# Patient Record
Sex: FEMALE | Race: WHITE | NOT HISPANIC OR LATINO | Employment: OTHER | URBAN - METROPOLITAN AREA
[De-identification: names, ages, dates, MRNs, and addresses within clinical notes are randomized per-mention and may not be internally consistent; named-entity substitution may affect disease eponyms.]

---

## 2018-07-25 ENCOUNTER — OFFICE VISIT CONVERTED (OUTPATIENT)
Dept: PULMONOLOGY | Facility: CLINIC | Age: 68
End: 2018-07-25
Attending: INTERNAL MEDICINE

## 2018-09-27 ENCOUNTER — OFFICE VISIT CONVERTED (OUTPATIENT)
Dept: PULMONOLOGY | Facility: CLINIC | Age: 68
End: 2018-09-27
Attending: INTERNAL MEDICINE

## 2019-02-07 ENCOUNTER — OFFICE VISIT CONVERTED (OUTPATIENT)
Dept: PULMONOLOGY | Facility: CLINIC | Age: 69
End: 2019-02-07
Attending: INTERNAL MEDICINE

## 2019-07-19 ENCOUNTER — OFFICE VISIT CONVERTED (OUTPATIENT)
Dept: PULMONOLOGY | Facility: CLINIC | Age: 69
End: 2019-07-19
Attending: PHYSICIAN ASSISTANT

## 2020-01-10 ENCOUNTER — OFFICE VISIT CONVERTED (OUTPATIENT)
Dept: PULMONOLOGY | Facility: CLINIC | Age: 70
End: 2020-01-10
Attending: INTERNAL MEDICINE

## 2021-05-28 VITALS
OXYGEN SATURATION: 98 % | RESPIRATION RATE: 16 BRPM | OXYGEN SATURATION: 98 % | DIASTOLIC BLOOD PRESSURE: 64 MMHG | HEIGHT: 61 IN | OXYGEN SATURATION: 97 % | HEART RATE: 72 BPM | TEMPERATURE: 98 F | HEART RATE: 84 BPM | HEART RATE: 76 BPM | HEIGHT: 61 IN | SYSTOLIC BLOOD PRESSURE: 128 MMHG | RESPIRATION RATE: 18 BRPM | BODY MASS INDEX: 42.29 KG/M2 | HEIGHT: 61 IN | BODY MASS INDEX: 39.7 KG/M2 | HEART RATE: 67 BPM | WEIGHT: 224 LBS | DIASTOLIC BLOOD PRESSURE: 88 MMHG | BODY MASS INDEX: 39.55 KG/M2 | TEMPERATURE: 98.1 F | TEMPERATURE: 98.2 F | WEIGHT: 209.5 LBS | WEIGHT: 215.06 LBS | WEIGHT: 210.25 LBS | SYSTOLIC BLOOD PRESSURE: 136 MMHG | DIASTOLIC BLOOD PRESSURE: 69 MMHG | RESPIRATION RATE: 14 BRPM | SYSTOLIC BLOOD PRESSURE: 121 MMHG | RESPIRATION RATE: 14 BRPM | HEIGHT: 61 IN | BODY MASS INDEX: 40.6 KG/M2 | SYSTOLIC BLOOD PRESSURE: 113 MMHG | DIASTOLIC BLOOD PRESSURE: 70 MMHG | OXYGEN SATURATION: 95 %

## 2021-05-28 VITALS
WEIGHT: 207.37 LBS | RESPIRATION RATE: 14 BRPM | HEIGHT: 61 IN | SYSTOLIC BLOOD PRESSURE: 134 MMHG | OXYGEN SATURATION: 99 % | HEART RATE: 65 BPM | BODY MASS INDEX: 39.15 KG/M2 | TEMPERATURE: 97.9 F | DIASTOLIC BLOOD PRESSURE: 85 MMHG

## 2021-05-28 NOTE — PROGRESS NOTES
Patient: EDMUND MOJICA     Acct: [id number]     Report: #[id number]  UNIT #: [id number]     : 1950    Encounter Date:2019  PRIMARY CARE: MATTHEW PETERSON  ***Signed***  --------------------------------------------------------------------------------------------------------------------  Chief Complaint      Encounter Date      2019            Primary Care Provider      REINA PETERSON            Referring Provider      REINA PETERSON            Patient Complaint      Patient is complaining of      Pt here for 6m f/u, copd            VITALS      Height 5 ft 1 in / 154.94 cm      Weight 207 lbs 6 oz / 94.521728 kg      BSA 1.92 m2      BMI 39.2 kg/m2      Temperature 97.9 F / 36.61 C - Oral      Pulse 65      Respirations 14      Blood Pressure 134/85 Sitting, Right Arm      Pulse Oximetry 99%, Room air            HPI      The patient is a pleasant 68 year old white female patient of Dr. Malcolm's here    for routine six month follow up today. She has had a history of COPD, mild     intermittent asthma, hypogammaglobulinemia.  She has been well-controlled on     Brovana and Pulmicort nebs along with Lonhala and tells me she has done well     since her last visit.  She has not been hospitalized or needed antibiotics or     steroids for any breathing issues since her last visit, however she tells she     has had steroids for sciatic pain which is improving. She feels like her     breathing actually does a little bit better in the summer when it is warmer and     does worse with cold air in winter.  She denies any increased dyspnea, coughing,    wheezing, hemoptysis, fever or chills.              I have reviewed her ROS, medical, surgical and family history and agree with     those as entered in the chart.      Copies To:   Jacky Malcolm ;            BYRON      Constitutional:  Denies: Fatigue, Fever, Weight gain, Weight loss, Chills,     Insomnia, Other      Respiratory/Breathing:   Complains of: Cough; Denies: Shortness of air, Wheezing,    Hemoptysis, Pleuritic pain, Other      Endocrine:  Denies: Polydipsia, Polyuria, Heat/cold intolerance, Abnorml     menstrual pattern, Diabetes, Other      Eyes:  Denies: Blurred vision, Vision Changes, Other      Ears, nose, mouth, throat:  Denies: Congestion, Dysphagia, Hearing Changes, Nose    Bleeding, Nasal Discharge, Throat pain, Tinnitus, Other      Cardiovascular:  Denies: Chest Pain, Exertional dyspnea, Peripheral Edema,     Palpitations, Syncope, Wake up Gasping for air, Orthopnea, Tachycardia, Other      Gastrointestinal:  Denies: Abdominal pain/cramping, Bloody stools, Constipation,    Diarrhea, Melena, Nausea, Vomiting, Other      Genitourinary:  Denies: Dysuria, Urinary frequency, Incontinence, Hematuria,     Urgency, Other      Musculoskeletal:  Denies: Joint Pain, Joint Stiffness, Joint Swelling, Myalgias,    Other      Hematologic/lymphatic:  DENIES: Lymphadenopathy, Bruising, Bleeding tendencies,     Other      Neurologic:  Denies: Headache, Numbness, Weakness, Seizures, Other      Psychiatric:  Denies: Anxiety, Appropriate Effect, Depression, Other      Sleep:  No: Excessive daytime sleep, Morning Headache?, Snoring, Insomnia?, Stop    breathing at sleep?, Other      Integumentary:  Denies: Rash, Dry skin, Skin Warm to Touch, Other            FAMILY/SOCIAL/MEDICAL HX      Surgical History:  Yes: Appendectomy, Cholecystectomy, Orthopedic Surgery (Right    knee); No: AAA Repair, Abdominal Surgery, Adenoids, Angioplasty, Back Surgery,     Bladder Surgery, Bowel Surgery, Breast Surgery, CABG, Carotid Stenosis, Ear     Surgery, Eye Surgery, Head Surgery, Hernia Surgery, Kidney Surgery, Nose     Surgery, Oral Surgery, Prostatectomy, Rectal Surgery, Spinal Surgery, Testicular    Surgery, Throat Surgery, Tonsils, Valve Replacement, Vascular Surgery, Other     Surgeries      Heart - Family Hx:  Father      Cancer/Type - Family Hx:  Mother, Father,  Brother, Sister      Other Family Medical History:  Mother, Father      Is Father Still Living?:  No      Is Mother Still Living?:  No       Family History:  Yes      Social History:  No Tobacco Use, No Alcohol Use, No Recreational Drug use      Smoking status:  Former smoker (smoked a little, quit 47 years ago)      Anticoagulation Therapy:  Yes      Antibiotic Prophylaxis:  No      Medical History:  Yes: Allergies, Arthritis, Asthma, Chronic Bronchitis/COPD,     Diabetes, Heart Attack, High Blood Pressure, High Cholesterol, Reflux Disease,     Thyroid Problem, Miscellaneous Medical/oth (afib); No: Alcoholism, Anemia,     Atrial Fibrillation, Blood Disease, Broken Bones, Cataracts, Chemical     Dependency, Chemotherapy/Cancer, Emphysema, Chronic Liver Disease, Colon     Trouble, Colitis, Diverticulitis, Congestive Heart Failu, Deafness or Ringing     Ears, Convulsions, Depression, Anxiety, Bipolar Disorder, PTSD, Epilepsy,     Seizures, Forgetfullness, Glaucoma, Gall Stones, Gout, Head Injury, Heart     Murmur, GERD, Hemorrhoids/Rectal Prob, Hepatitis, Hiatal Hernia, HIV (Do not ask    - volu, Jaundice, Kidney or Bladder Disease, Kidney Stones, Migrane Headaches,     Mitral Valve Prolapse, Night sweats, Phlebitis, Psychiatric Care, Rheumatic     Fever, Sexually Transmitted Dis, Shortness Of Breath, Sinus Trouble, Skin     Disease/Psoriais/Ecz, Stroke, Tuberculosis or Pos TB Te      Psychiatric History      None            PREVENTION      Hx Influenza Vaccination:  Yes      Date Influenza Vaccine Given:  Oct 1, 2018      Influenza Vaccine Declined:  No      2 or More Falls Past Year?:  No      Fall Past Year with Injury?:  No      Hx Pneumococcal Vaccination:  Yes      Encouraged to follow-up with:  PCP regarding preventative exams.      Chart initiated by      Renetta Haro MA            ALLERGIES/MEDICATIONS      Allergies:        Coded Allergies:             CEPHALEXIN (Verified  Allergy, Intermediate, vomiting,  7/19/19)           CHLORPHENIRAMINE (Verified  Allergy, Intermediate, 7/19/19)           CODEINE (Verified  Allergy, Intermediate, swelling, 7/19/19)           HYDROCODONE (Verified  Allergy, Intermediate, 7/19/19)           NALBUPHINE (Verified  Allergy, Intermediate, breaks out, 7/19/19)           PENICILLINS (Verified  Allergy, Intermediate, swellling, 7/19/19)      Medications    Last Reconciled on 7/19/19 08:48 by JONY FERNANDEZ      Neb-Budesonide (Pulmicort) 0.5 Mg/2 Ml Ampul.neb      0.5 MG INH RTBID, #60 NEB 3 Refills         Prov: Jacky Malcolm         5/24/19       Arformoterol Tartrate (Brovana) 15 Mcg/2 Ml Vial.neb      15 MCG INH RTBID, #60 NEB 11 Refills         Prov: Jacky Malcolm         9/27/18       Montelukast Sodium (Singulair*) 10 Mg Tab      10 MG PO HS, #30 TAB 11 Refills         Prov: Jacky Malcolm         9/27/18       Aspirin Chew (Aspirin Chew) 81 Mg Tab.chew      81 MG PO QDAY, #30 TAB.CHEW 0 Refills         Reported         9/27/18       Glycopyrrol/Nebulizer/Accessor (Lonhala Magnair 25 Mcg Starter) 25 Mcg/1 Ml     Vial.neb      MCG INH RTBID, #60         Reported         7/25/18       Cyanocobalamin (Vitamin B-12*) 1,000 Mcg Tablet.er      1000 MCG PO QDAY, #30 TAB.ER         Reported         7/25/18       Apixaban (Eliquis) 5 Mg Tablet      5 MG PO BID for 30 Days, #60 TAB         Reported         7/25/18       Albuterol/Ipratropium (Duoneb) 3 Ml Ampul.neb      3 ML INH Q4H PRN for SHORTNESS OF BREATH, #120 NEB 0 Refills         Reported         7/25/18       Liraglutide (Victoza 2-SAM) 0.1 Ml Cartridge      18 MG SQ QDAY, EA         Reported         7/25/18       Ezetimide (Zetia) 10 Mg Tablet      10 MG PO HS, #30 TAB 0 Refills         Reported         7/25/18       Ranitidine HCl (Zantac*) 150 Mg Tablet      150 MG PO HS for 30 Days, #30 TAB         Reported         7/25/18       Nitroglycerin (Nitrostat*) 0.4 Mg Tablet      0.4 MG SL ASDIR, #25 TAB 1 Refill          Reported         7/25/18       Metoprolol Succinate (Metoprolol Succinate) 50 Mg Tab.er.24h      25 MG PO BID, #30 TAB.SR.24H 0 Refills         Reported         7/25/18       Loratadine (Loratadine) 10 Mg Tablet      10 MG PO QDAY, #30 TAB 0 Refills         Reported         7/25/18       Lisinopril/HCTZ 20/25 MG (Lisinopril/HCTZ 20/25 MG*) 1 Each Tablet      0.5 TAB PO QDAY, #30 TAB 0 Refills         Reported         7/25/18       Levothyroxine Sodium (Levoxyl*) 0.05 Mg Tablet      0.05 MG PO QDAY@07, #30 TAB 0 Refills         Reported         7/25/18       Gabapentin (Gabapentin) 400 Mg Capsule      400 MG PO TID, #90 CAP 0 Refills         Reported         7/25/18       Hunter-Fluticasone (Fluticasone 50 mcg) 16 Gm Spray.susp      2 PUFFS NARE EACH QDAY, #1 BOTTLE 0 Refills         Reported         7/25/18       Atorvastatin (Atorvastatin) 20 Mg Tablet      20 MG PO HS, #30 TAB 0 Refills         Reported         7/25/18      Current Medications      Current Medications Reviewed 7/19/19            EXAM      GEN-patient appears stated age resting comfortable in no acute distress      Eyes-PERRL,  conjunctiva are normal in appearance extraocular muscles are i    ntact, no scleral icterus      Nasal-both nares are patent turbinates appear normal no polyps seen no nasal     discharge or ulcerations      Ears-tympanic membranes are normal no erythema no bulging, normal to inspection      Lymphatic-no swollen or enlarged cervical nodes, or axillary node, or femoral     nodes, or supraclavicular nodes      Mouth normal dentition, no erythema no ulcerations oropharynx appears normal no     exudate no evidence of postnasal drip, MP(default value)      Neck-there are no palpable supraclavicular or cervical adenopathy, thyroid is     normal in appearance no apparent nodules, there is no inspiratory or expiratory     stridor      Respiratory-Lungs are grossly clear to auscultation.  No wheezes, rhonchi or     crackles appreciated.   Normal work of breathing.        Cardiovascular-the heart rate is normal and regular S1 and S2 present with no     murmur or extra heart sounds, there is no JVD or pedal edema present      GI-the abdomen is normal in appearance, bowel sounds present and normal in all     quadrants no hepatosplenomegaly or masses felt      Extremities-no clubbing is present, pulses present in all extremities, capillary    refill time is normal      Musculoskeletal-Normal strength in upper and lower extremities, inspection shows    no evidence of muscle atrophy      Skin-skin is normal in appearance it is warm and dry, no rashes present, no     evidence of cyanosis, palpation reveals no masses      Neurological-the patient is alert and oriented to time place and person, moves     all 4 extremities, normal gait, normal affect and mood, CN2-12 intact      Psych-normal judgment and insight is good, normal mood and affect, alert and     oriented to person, place, time and date      Vitals      Vitals:             Height 5 ft 1 in / 154.94 cm           Weight 207 lbs 6 oz / 94.341516 kg           BSA 1.92 m2           BMI 39.2 kg/m2           Temperature 97.9 F / 36.61 C - Oral           Pulse 65           Respirations 14           Blood Pressure 134/85 Sitting, Right Arm           Pulse Oximetry 99%, Room air            REVIEW      Results Reviewed      [facility] Results Reviewed?:  Yes Prev Lab Results, Yes Prev Radiology Results, Yes     Previous [facility] Records      Lab Results      I have personally reviewed her previous lab work, imaging and provider notes.            Assessment      Notes      Renewed Medications      * Montelukast Sodium (Singulair*) 10 MG TAB: 10 MG PO HS #30      * ARFORMOTEROL TARTRATE (Brovana) 15 MCG/2 ML VIAL.NEB: 15 MCG INH RTBID #60         Instructions: DIAGNOSIS CODE REQUIRED PRIOR TO PRESCRIBING.      ASSESSMENT:       1. Mild intermittent asthma without acute exacerbation.      2. COPD without acute  exacerbation.        3. Hypogammaglobulinemia.            PLAN:      1. Continue current with regimen with Brovana and Pulmicort bid, Lonhala nebs     twice a day and Singulair.      2.  Up-to-date on Fluzone vaccine and Pneumovax.      3.  I will have the patient follow up in six months with Dr. Malcolm, sooner if     needed.            Patient Education      Patient Education Provided:  COPD, How to use a Nebulizer      Time Spent:  > 50% /[facility]                 Disclaimer: Converted document may not contain table formatting or lab diagrams. Please see [facility] for the authenticated document.

## 2021-05-28 NOTE — PROGRESS NOTES
Patient: EDMUND MOJICA     Acct: [id number]     Report: #[id number]  UNIT #: [id number]     : 1950    Encounter Date:2018  PRIMARY CARE: MATTHEW CASAREZ  ***Signed***  --------------------------------------------------------------------------------------------------------------------  Chief Complaint      Encounter Date      2018            Primary Care Provider            Cristian Casarez            Referring Provider            Cristian Casarez            Patient Complaint      Patient is complaining of      New pt here for copd            VITALS      Height 5 ft 1 in / 154.94 cm      Weight 215 lbs 1 oz / 97.016082 kg      BSA 2.10 m2      BMI 40.6 kg/m2      Pulse 76      Respirations 14      Blood Pressure 113/70 Sitting, Left Arm      Pulse Oximetry 95%, Room air            HPI      The patient is a 67 year old white female with reported COPD here today for     evaluation. The patient has been hospitalized every several months for the past     three years for reported COPD, though she has never smoked.  The patient has     been told that she has asthma in the past.  She does have shortness of breath.      On a good week she will have shortness of breath approximately three days per     week.  Most weeks she is short of breath nearly every day.  Her shortness of     breath is worse when she does certain activities such as bending over and     putting on her shoes.  She is able to change her clothes without having any     difficulties, however when she showers, she becomes very dyspneic.  There are     also certain fragrances that makes her more short of breath as well such as     vanilla fragrances and certain strong perform as well as strong scented     candles.  The patient does take Brovana and Spiriva, but only takes Brovana on     an as needed basis. She has had some issues taking medications in the past.  At     one time she was taking Brovana every 4 hours as needed as  opposed to her     DuoNebs.  She denies having any chest pains, lower extremity edema or heart     failure type symptoms. 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 The patient     does have cough as well.  Her cough is dry and nonproductive, no hemoptysis.      It is partially relieved with her bronchodilator therapies, but not fully.  The     patient does have particular times of the year where her symptoms are worse,     usually in the spring and the fall. She does endorse some allergies, but is not     currently taking any medications for this at this time other than some prn     loratadine that she does not take regularly.            ROS      Constitutional:  Denies: Fatigue, Fever, Weight gain, Weight loss, Chills,     Insomnia, Other      Respiratory/Breathing:  Denies: Shortness of air, Wheezing, Cough, Hemoptysis,     Pleuritic pain, Other      Endocrine:  Denies: Polydipsia, Polyuria, Heat/cold intolerance, Abnorml     menstrual pattern, Diabetes, Other      Eyes:  Denies: Blurred vision, Vision Changes, Other      Ears, nose, mouth, throat:  Denies: Congestion, Dysphagia, Hearing Changes,     Nose Bleeding, Nasal Discharge, Throat pain, Tinnitus, Other      Cardiovascular:  Denies: Chest Pain, Exertional dyspnea, Peripheral Edema,     Palpitations, Syncope, Wake up Gasping for air, Orthopnea, Tachycardia, Other      Gastrointestinal:  Denies: Abdominal pain/cramping, Bloody stools, Constipation    , Diarrhea, Melena, Nausea, Vomiting, Other      Genitourinary:  Denies: Dysuria, Urinary frequency, Incontinence, Hematuria,     Urgency, Other      Musculoskeletal:  Denies: Joint Pain, Joint Stiffness, Joint Swelling, Myalgias    , Other      Hematologic/lymphatic:  DENIES: Lymphadenopathy, Bruising, Bleeding tendencies,     Other      Neurologic:  Denies:  Headache, Numbness, Weakness, Seizures, Other      Psychiatric:  Denies: Anxiety, Appropriate Effect, Depression, Other      Sleep:  Yes: Snoring, Insomnia?, No: Excessive daytime sleep, Morning Headache?    , Stop breathing at sleep?, Other      Integumentary:  Denies: Rash, Dry skin, Skin Warm to Touch, Other            FAMILY/SOCIAL/MEDICAL HX      Surgical History:  Yes: Appendectomy, Cholecystectomy, Orthopedic Surgery (    Right knee)      Heart - Family Hx:  Father      Cancer/Type - Family Hx:  Mother, Father, Brother, Sister      Other Family Medical History:  Mother, Father      Is Father Still Living?:  No      Is Mother Still Living?:  No      Smoking status:  Former smoker (smoked a little, quit 47 years ago)      Anticoagulation Therapy:  Yes      Antibiotic Prophylaxis:  No      Medical History:  Yes: Allergies, Arthritis, Asthma, Chronic Bronchitis/COPD,     Diabetes, Heart Attack, High Blood Pressure, High Cholesterol, Reflux Disease,     Thyroid Problem, Miscellaneous Medical/oth (afib)      Psychiatric History      none            PREVENTION      Hx Influenza Vaccination:  Yes      Date Influenza Vaccine Given:  Oct 1, 2017      Influenza Vaccine Declined:  No      2 or More Falls Past Year?:  No      Fall Past Year with Injury?:  No      Hx Pneumococcal Vaccination:  Yes      Encouraged to follow-up with:  PCP regarding preventative exams.      Chart initiated by      nigel macias/ ma            ALLERGIES/MEDICATIONS      Allergies:        Coded Allergies:             Codeine (Verified  Allergy, Intermediate, swelling, 7/25/18)           Keflex (Verified  Allergy, Intermediate, vomiting, 7/25/18)           Nubain (Verified  Allergy, Intermediate, breaks out, 7/25/18)           Penicillins (Verified  Allergy, Intermediate, swellling, 7/25/18)           Tussionex (Verified  Allergy, Intermediate, 7/25/18)      Medications      Glycopyrrol/Nebulizer/Accessor (Lonhala Magnair 25 Mcg Starter) 25 Mcg/1  Ml     Vial.neb      MCG INH RTBID, #60         Reported         7/25/18       Arformoterol Tartrate (Brovana) 15 Mcg/2 Ml Vial.neb      15 MCG INH RTBID, #60 NEB         Reported         7/25/18       Cyanocobalamin (Vitamin B-12*) 1,000 Mcg Tablet.er      1000 MCG PO QDAY, #30 TAB.ER         Reported         7/25/18       Apixaban (Eliquis) 5 Mg Tablet      5 MG PO BID for 30 Days, #60 TAB         Reported         7/25/18       Albuterol/Ipratropium (Duoneb) 3 Ml Ampul.neb      3 ML INH Q4H Y for SHORTNESS OF BREATH, #120 NEB 0 Refills         Reported         7/25/18       Liraglutide (Victoza Inj) 0.1 Ml Cartridge      18 MG SQ QDAY, EA         Reported         7/25/18       Ezetimide (Zetia) 10 Mg Tablet      10 MG PO HS, #30 TAB 0 Refills         Reported         7/25/18       Ranitidine HCl (Zantac*) 150 Mg Tablet      150 MG PO HS for 30 Days, #30 TAB         Reported         7/25/18       Clopidogrel Bisulfate (Plavix) 75 Mg Tablet      75 MG PO QDAY, #30 TAB 0 Refills         Reported         7/25/18       Nitroglycerin (Nitrostat*) 0.4 Mg Tablet      0.4 MG SL ASDIR, #25 TAB 1 Refill         Reported         7/25/18       Metoprolol Succinate (Metoprolol Succinate*) 50 Mg Tab.er.24h      25 MG PO BID, #30 TAB.SR.24H 0 Refills         Reported         7/25/18       Loratadine (Loratadine) 10 Mg Tablet      10 MG PO QDAY, #30 TAB 0 Refills         Reported         7/25/18       Lisinopril/HCTZ 20/25 MG (Lisinopril/HCTZ 20/25 MG*) 1 Each Tablet      0.5 TAB PO QDAY, #30 TAB 0 Refills         Reported         7/25/18       Levothyroxine Sodium (Levoxyl*) 0.05 Mg Tablet      0.05 MG PO QDAY@07, #30 TAB 0 Refills         Reported         7/25/18       Gabapentin (Gabapentin) 400 Mg Capsule      400 MG PO TID, #90 CAP 0 Refills         Reported         7/25/18       Hunter-Fluticasone (Fluticasone 50 mcg) 16 Gm Spray.susp      2 PUFFS NARE EACH QDAY, #1 BOTTLE 0 Refills         Reported         7/25/18        Atorvastatin (Atorvastatin) 20 Mg Tablet      20 MG PO HS, #30 TAB 0 Refills         Reported         7/25/18      Current Medications      Current Medications Reviewed 7/25/18            EXAM      GEN-patient appears stated age resting comfortable in no acute distress      Eyes-PERRL,  conjunctiva are normal in appearance extraocular muscles are intact    , no scleral icterus      Nasal-both nares are patent turbinates appear normal no polyps seen no nasal     discharge or ulcerations      Ears-tympanic membranes are normal no erythema no bulging, normal to inspection      Lymphatic-no swollen or enlarged cervical nodes, or axillary node, or femoral     nodes, or supraclavicular nodes      Mouth normal dentition, no erythema no ulcerations oropharynx appears normal no     exudate no evidence of postnasal drip, MP(2)      Neck-there are no palpable supraclavicular or cervical adenopathy, thyroid is     normal in appearance no apparent nodules, there is no inspiratory or expiratory     stridor      Respiratory-patient has increased AP diameter, exhibits normal work of breathing    , speaking in full sentences without difficulty, the chest is normal in     appearance, clear to auscultation with no wheezes rales or rhonchi, chest is     normal to percussion on both the right and left sides      Cardiovascular-the heart rate is normal and regular S1 and S2 present with no     murmur or extra heart sounds, there is no JVD or pedal edema present      GI-the abdomen is normal in appearance, bowel sounds present and normal in all     quadrants no hepatosplenomegaly or masses felt      Extremities-no clubbing is present, pulses present in all extremities,     capillary refill time is normal      Musculoskeletal-Normal strength in upper and lower extremities, inspection     shows no evidence of muscle atrophy      Skin-skin is normal in appearance it is warm and dry, no rashes present, no     evidence of cyanosis, palpation  reveals no masses      Neurological-the patient is alert and oriented to time place and person, moves     all 4 extremities, normal gait, normal affect and mood, CN2-12 intact      Psych-normal judgment and insight is good, normal mood and affect, alert and     oriented to person, place, and time, and date      Vitals      Vitals:             Height 5 ft 1 in / 154.94 cm           Weight 215 lbs 1 oz / 97.240262 kg           BSA 2.10 m2           BMI 40.6 kg/m2           Pulse 76           Respirations 14           Blood Pressure 113/70 Sitting, Left Arm           Pulse Oximetry 95%, Room air            REVIEW      Results Reviewed      [facility] Results Reviewed?:  Yes Prev Lab Results, Yes Prev Radiology Results, Yes     Previous [facility] Records            Assessment      Asthma - J45.909            Notes      New Medications      * Atorvastatin 20 MG TABLET: 20 MG PO HS #30      * Hunter-Fluticasone (Fluticasone 50 mcg) 16 GM SPRAY.SUSP: 2 PUFFS NARE EACH QDAY     #1      * Gabapentin 400 MG CAPSULE: 400 MG PO TID #90      * Levothyroxine Sodium (Levoxyl*) 0.05 MG TABLET: 0.05 MG PO QDAY@07 #30      * Lisinopril/HCTZ 20/25 MG (Lisinopril/HCTZ 20/25 MG*) 1 EACH TABLET: 0.5 TAB     PO QDAY #30      * Loratadine 10 MG TABLET: 10 MG PO QDAY #30      * Metoprolol Succinate (Metoprolol Succinate*) 50 MG TAB.ER.24H: 25 MG PO BID #    30      * NITROGLYCERIN (Nitrostat*) 0.4 MG TABLET: 0.4 MG SL ASDIR #25       Instructions: DOSE= 1 TABLET SL EVERY 5 MINUTES PRN CHEST PAIN UP TO 3 TABLETS     PER EPISODE      * Clopidogrel Bisulfate (Plavix) 75 MG TABLET: 75 MG PO QDAY #30      * Ranitidine HCl (Zantac*) 150 MG TABLET: 150 MG PO HS 30 Days #30      * Ezetimide (Zetia) 10 MG TABLET: 10 MG PO HS #30      * Liraglutide (Victoza Inj) 0.1 ML CARTRIDGE: 18 MG SQ QDAY      * Albuterol/Ipratropium (Duoneb) 3 ML AMPUL.NEB: 3 ML INH Q4H PRN SHORTNESS OF     BREATH #120       Instructions: DIAGNOSIS CODE REQUIRED PRIOR TO PRESCRIBING.       * Apixaban (Eliquis) 5 MG TABLET: 5 MG PO BID 30 Days #60      * CYANOCOBALAMIN (Vitamin B-12*) 1,000 MCG TABLET.ER: 1,000 MCG PO QDAY #30      * ARFORMOTEROL TARTRATE (Brovana) 15 MCG/2 ML VIAL.NEB: 15 MCG INH RTBID #60       Instructions: DIAGNOSIS CODE REQUIRED PRIOR TO PRESCRIBING.      * GLYCOPYRROL/NEBULIZER/ACCESSOR (Lonhala Magnair 25 Mcg Starter) 25 MCG/1 ML     VIAL.NEB: MCG INH RTBID #60      * Neb-Budesonide (Pulmicort) 0.5 MG/2 ML AMPUL.NEB: 0.5 MG INH RTBID #60       Instructions: DIAGNOSIS CODE REQUIRED PRIOR TO PRESCRIBING.      New Diagnostics      * Immunoglobulin  E (I, Week       Dx: Asthma - J45.909      * IgG SUBCLASSES 1-4  IGGSC, Routine       Dx: Asthma - J45.909      * Immuno G (Igg), Routine       Dx: Asthma - J45.909      * Immuno A (Iga), Routine       Dx: Asthma - J45.909      * Immuno M (Igm), Routine       Dx: Asthma - J45.909      * PFT-Comp, PrePost,DLCO,BodyBox, Week       Dx: Asthma - J45.909      ASSESSMENT:       1. Recurrent bronchitis, likely asthma.      2. Reported COPD.            PLAN:      1. At this time, we will obtain pulmonary function studies.      2.  Obtain IgE level.      3.  Obtain immunoglobulin level and subclass deficiencies given the patient's     recurrent bronchitis.      4. We will continue the patient on Brovana and Lonhala, however I do feel this     patient is asthmatic given her history. She needs to be on inhaled     corticosteroids. We will start Pulmicort at this time twice a day.  I have     elected to do Pulmicort because I am not certain the patient has the     coordination or the capacity to take a handheld device.        5. Attempted to do FENO in the office today, but the patient was not able to     coordinate her breaths to obtain this test adequately.      6. In instructed the patient and stressed the patient on the importance of     taking these medications as prescribed.  I feel at this time her cough is most     likely related to  untreated asthma. If indeed she is still having significant     cough at her next office visit then at this time I will consider     discontinuation of the lisinopril.      7.  I have personally reviewed laboratory data, imaging as well as previous     medical records.            Patient Education      Education resources provided:  Yes      Patient Education Provided:  Acute Asthma                 Disclaimer: Converted document may not contain table formatting or lab diagrams. Please see [facility] for the authenticated document.

## 2021-05-28 NOTE — PROGRESS NOTES
Patient: EDMUND MOJICA     Acct: [id number]     Report: #[id number]  UNIT #: [id number]     : 1950    Encounter Date:2019  PRIMARY CARE: MATTHEW PETERSON  ***Signed***  --------------------------------------------------------------------------------------------------------------------  Chief Complaint      Encounter Date      2019            Primary Care Provider      REINA PETERSON            Referring Provider      REINA PETERSON            Patient Complaint      Patient is complaining of      Pt here for 5m f/u and lab results, copd            VITALS      Height 5 ft 1 in / 154.94 cm      Weight 209 lbs 8 oz / 95.316093 kg      BSA 1.93 m2      BMI 39.6 kg/m2      Temperature 98.2 F / 36.78 C - Oral      Pulse 84      Respirations 16      Blood Pressure 128/64 Sitting, Right Arm      Pulse Oximetry 98%, Room air            HPI      The patient is a 68 year old female here for follow up, doing well, has had only    one episode of bronchitis in the last year, feels that her nebulizers are     helping, complains of very little shortness of breath, has no significant     wheezing at this time, has had no hospitalizations since last office visit. She     overall feels starting on her medication regimen that she is currently on her     breathing is much improved.            ROS      Constitutional:  Denies: Fatigue, Fever, Weight gain, Weight loss, Chills,     Insomnia, Other      Respiratory/Breathing:  Complains of: Shortness of air, Wheezing, Cough; Denies:    Hemoptysis, Pleuritic pain, Other      Endocrine:  Denies: Polydipsia, Polyuria, Heat/cold intolerance, Abnorml     menstrual pattern, Diabetes, Other      Eyes:  Denies: Blurred vision, Vision Changes, Other      Ears, nose, mouth, throat:  Denies: Congestion, Dysphagia, Hearing Changes, Nose    Bleeding, Nasal Discharge, Throat pain, Tinnitus, Other      Cardiovascular:  Denies: Chest Pain, Exertional dyspnea, Peripheral Edema,      Palpitations, Syncope, Wake up Gasping for air, Orthopnea, Tachycardia, Other      Gastrointestinal:  Denies: Abdominal pain/cramping, Bloody stools, Constipation,    Diarrhea, Melena, Nausea, Vomiting, Other      Genitourinary:  Denies: Dysuria, Urinary frequency, Incontinence, Hematuria,     Urgency, Other      Musculoskeletal:  Denies: Joint Pain, Joint Stiffness, Joint Swelling, Myalgias,    Other      Hematologic/lymphatic:  DENIES: Lymphadenopathy, Bruising, Bleeding tendencies,     Other      Neurologic:  Denies: Headache, Numbness, Weakness, Seizures, Other      Psychiatric:  Denies: Anxiety, Appropriate Effect, Depression, Other      Sleep:  No: Excessive daytime sleep, Morning Headache?, Snoring, Insomnia?, Stop    breathing at sleep?, Other      Integumentary:  Denies: Rash, Dry skin, Skin Warm to Touch, Other            FAMILY/SOCIAL/MEDICAL HX      Surgical History:  Yes: Appendectomy, Cholecystectomy, Orthopedic Surgery (Right    knee); No: AAA Repair, Abdominal Surgery, Adenoids, Angioplasty, Back Surgery,     Bladder Surgery, Bowel Surgery, Breast Surgery, CABG, Carotid Stenosis, Ear     Surgery, Eye Surgery, Head Surgery, Hernia Surgery, Kidney Surgery, Nose     Surgery, Oral Surgery, Prostatectomy, Rectal Surgery, Spinal Surgery, Testicular    Surgery, Throat Surgery, Tonsils, Valve Replacement, Vascular Surgery, Other     Surgeries      Heart - Family Hx:  Father      Cancer/Type - Family Hx:  Mother, Father, Brother, Sister      Other Family Medical History:  Mother, Father      Is Father Still Living?:  No      Is Mother Still Living?:  No       Family History:  Yes      Social History:  No Tobacco Use, No Alcohol Use, No Recreational Drug use      Smoking status:  Former smoker (smoked a little, quit 47 years ago)      Anticoagulation Therapy:  Yes      Antibiotic Prophylaxis:  No      Medical History:  Yes: Allergies, Arthritis, Asthma, Chronic Bronchitis/COPD,     Diabetes, Heart Attack,  High Blood Pressure, High Cholesterol, Reflux Disease,     Thyroid Problem, Miscellaneous Medical/oth (afib); No: Alcoholism, Anemia,     Atrial Fibrillation, Blood Disease, Broken Bones, Cataracts, Chemical     Dependency, Chemotherapy/Cancer, Emphysema, Chronic Liver Disease, Colon     Trouble, Colitis, Diverticulitis, Congestive Heart Failu, Deafness or Ringing Ea    rs, Convulsions, Depression, Anxiety, Bipolar Disorder, PTSD, Epilepsy,     Seizures, Forgetfullness, Glaucoma, Gall Stones, Gout, Head Injury, Heart     Murmur, GERD, Hemorrhoids/Rectal Prob, Hepatitis, Hiatal Hernia, HIV (Do not ask    - volu, Jaundice, Kidney or Bladder Disease, Kidney Stones, Migrane Headaches,     Mitral Valve Prolapse, Night sweats, Phlebitis, Psychiatric Care, Rheumatic Fe    blaine, Sexually Transmitted Dis, Shortness Of Breath, Sinus Trouble, Skin     Disease/Psoriais/Ecz, Stroke, Tuberculosis or Pos TB Te      Psychiatric History      None            PREVENTION      Hx Influenza Vaccination:  Yes      Date Influenza Vaccine Given:  Oct 1, 2018      Influenza Vaccine Declined:  No      2 or More Falls Past Year?:  No      Fall Past Year with Injury?:  No      Hx Pneumococcal Vaccination:  Yes      Encouraged to follow-up with:  PCP regarding preventative exams.      Chart initiated by      Renetta macias MA            ALLERGIES/MEDICATIONS      Allergies:        Coded Allergies:             CEPHALEXIN (Verified  Allergy, Intermediate, vomiting, 2/7/19)           CHLORPHENIRAMINE (Verified  Allergy, Intermediate, 2/7/19)           CODEINE (Verified  Allergy, Intermediate, swelling, 2/7/19)           HYDROCODONE (Verified  Allergy, Intermediate, 2/7/19)           NALBUPHINE (Verified  Allergy, Intermediate, breaks out, 2/7/19)           PENICILLINS (Verified  Allergy, Intermediate, swellling, 2/7/19)      Medications    Last Reconciled on 2/7/19 09:47 by KETURAH SEQUEIRA MD      Arformoterol Tartrate (Brovana) 15 Mcg/2 Ml Vial.neb       15 MCG INH RTBID, #60 NEB 11 Refills         Prov: Jacky Malcolm         9/27/18       Montelukast Sodium (Singulair*) 10 Mg Tab      10 MG PO HS, #30 TAB 11 Refills         Prov: Jacky Malcolm         9/27/18       Aspirin Chew (Aspirin Chew) 81 Mg Tab.chew      81 MG PO QDAY, #30 TAB.CHEW 0 Refills         Reported         9/27/18       Neb-Budesonide (Pulmicort) 0.5 Mg/2 Ml Ampul.neb      0.5 MG INH RTBID, #60 NEB 6 Refills         Prov: Jacky Malcolm         7/25/18       Glycopyrrol/Nebulizer/Accessor (Lonhala Magnair 25 Mcg Starter) 25 Mcg/1 Ml     Vial.neb      MCG INH RTBID, #60         Reported         7/25/18       Cyanocobalamin (Vitamin B-12*) 1,000 Mcg Tablet.er      1000 MCG PO QDAY, #30 TAB.ER         Reported         7/25/18       Apixaban (Eliquis) 5 Mg Tablet      5 MG PO BID for 30 Days, #60 TAB         Reported         7/25/18       Albuterol/Ipratropium (Duoneb) 3 Ml Ampul.neb      3 ML INH Q4H PRN for SHORTNESS OF BREATH, #120 NEB 0 Refills         Reported         7/25/18       Liraglutide (Victoza Inj) 0.1 Ml Cartridge      18 MG SQ QDAY, EA         Reported         7/25/18       Ezetimide (Zetia) 10 Mg Tablet      10 MG PO HS, #30 TAB 0 Refills         Reported         7/25/18       Ranitidine HCl (Zantac*) 150 Mg Tablet      150 MG PO HS for 30 Days, #30 TAB         Reported         7/25/18       Nitroglycerin (Nitrostat*) 0.4 Mg Tablet      0.4 MG SL ASDIR, #25 TAB 1 Refill         Reported         7/25/18       Metoprolol Succinate (Metoprolol Succinate*) 50 Mg Tab.er.24h      25 MG PO BID, #30 TAB.SR.24H 0 Refills         Reported         7/25/18       Loratadine (Loratadine) 10 Mg Tablet      10 MG PO QDAY, #30 TAB 0 Refills         Reported         7/25/18       Lisinopril/HCTZ 20/25 MG (Lisinopril/HCTZ 20/25 MG*) 1 Each Tablet      0.5 TAB PO QDAY, #30 TAB 0 Refills         Reported         7/25/18       Levothyroxine Sodium (Levoxyl*) 0.05 Mg Tablet      0.05 MG PO QDAY@07,  #30 TAB 0 Refills         Reported         7/25/18       Gabapentin (Gabapentin) 400 Mg Capsule      400 MG PO TID, #90 CAP 0 Refills         Reported         7/25/18       Hunter-Fluticasone (Fluticasone 50 mcg) 16 Gm Spray.susp      2 PUFFS NARE EACH QDAY, #1 BOTTLE 0 Refills         Reported         7/25/18       Atorvastatin (Atorvastatin) 20 Mg Tablet      20 MG PO HS, #30 TAB 0 Refills         Reported         7/25/18      Current Medications      Current Medications Reviewed 2/7/19            EXAM      GEN-patient appears stated age resting comfortable in no acute distress      Eyes-PERRL,  conjunctiva are normal in appearance extraocular muscles are     intact, no scleral icterus      Nasal-both nares are patent turbinates appear normal no polyps seen no nasal     discharge or ulcerations      Ears-tympanic membranes are normal no erythema no bulging, normal to inspection      Lymphatic-no swollen or enlarged cervical nodes, or axillary node, or femoral     nodes, or supraclavicular nodes      Mouth normal dentition, no erythema no ulcerations oropharynx appears normal no     exudate no evidence of postnasal drip, MP(2)      Neck-there are no palpable supraclavicular or cervical adenopathy, thyroid is     normal in appearance no apparent nodules, there is no inspiratory or expiratory     stridor      Respiratory-patient has increased AP diameter, exhibits normal work of     breathing, speaking in full sentences without difficulty, the chest is normal in     appearance, clear to auscultation with no wheezes rales or rhonchi, chest is     normal to percussion on both the right and left sides      Cardiovascular-the heart rate is normal and regular S1 and S2 present with no     murmur or extra heart sounds, there is no JVD or pedal edema present      GI-the abdomen is normal in appearance, bowel sounds present and normal in all     quadrants no hepatosplenomegaly or masses felt      Extremities-no clubbing is  present, pulses present in all extremities, capillary     refill time is normal      Musculoskeletal-Normal strength in upper and lower extremities, inspection shows     no evidence of muscle atrophy      Skin-skin is normal in appearance it is warm and dry, no rashes present, no     evidence of cyanosis, palpation reveals no masses      Neurological-the patient is alert and oriented to time place and person, moves     all 4 extremities, normal gait, normal affect and mood, CN2-12 intact      Psych-normal judgment and insight is good, normal mood and affect, alert and     oriented to person, place, and time, and date      Vitals      Vitals:             Height 5 ft 1 in / 154.94 cm           Weight 209 lbs 8 oz / 95.983401 kg           BSA 1.93 m2           BMI 39.6 kg/m2           Temperature 98.2 F / 36.78 C - Oral           Pulse 84           Respirations 16           Blood Pressure 128/64 Sitting, Right Arm           Pulse Oximetry 98%, Room air            REVIEW      Results Reviewed      [facility] Results Reviewed?:  Yes Prev Lab Results, Yes Prev Radiology Results, Yes     Previous Mec[facility] Records            Assessment      ASSESSMENT:       1. Mild intermittent asthma.      2. Reported COPD.      3. Hypogammaglobulinemia.            PLAN:      1. Continue Brovana.      2.  Continue Pulmicort.      3. Continue Lonhala.      4. Continue Singulair.      5. Up-to-date on Fluzone vaccine and Pneumovax.      6.  See patient back in six months as she is doing well at this time.            Patient Education      Education resources provided:  Yes      Patient Education Provided:  Acute Asthma                 Disclaimer: Converted document may not contain table formatting or lab diagrams. Please see [email or web address] for the authenticated document.

## 2021-05-28 NOTE — PROGRESS NOTES
Patient: EDMUND MOJICA     Acct: [id number]     Report: #[id number]  UNIT #: [id number]     : 1950    Encounter Date:2018  PRIMARY CARE: MATTHEW CASAREZ  ***Signed***  --------------------------------------------------------------------------------------------------------------------  Chief Complaint      Encounter Date      Sep 27, 2018            Primary Care Provider            Cristian Casarez            Referring Provider            Cristian Casarez            Patient Complaint      Patient is complaining of      2 month follow up/pft/ lab results follow up            VITALS      Height 5 ft 1 in / 154.94 cm      Weight 224 lbs 0 oz / 101.803786 kg      BSA 2.15 m2      BMI 42.3 kg/m2      Temperature 98.0 F / 36.67 C - Oral      Pulse 72      Respirations 14      Blood Pressure 136/88 Sitting, Left Arm      Pulse Oximetry 98%, Room air            HPI      The patient is a very pleasant 68 year old white female here today for follow     up.             She had pulmonary function studies since her last office visit that showed the     presence of significant reversibility consistent with asthma. The patient still     has some shortness of breath, worse with exertion, better with rest. She does     have chest tightness and episodic cough. Her symptoms are better now that she     has been on Brovana and Pulmicort. She has no worsening of her symptoms and feel    that her symptoms are very manageable at this time and are not preventing her     from doing her activities of daily living or day to day activities. The patient     has no associated symptoms at this time, no other aggravating or relieving     factors other than the use of her bronchodilator therapies and exertional     activities.            ROS      Constitutional:  Denies: Fatigue, Fever, Weight gain, Weight loss, Chills,     Insomnia, Other      Respiratory/Breathing:  Denies: Shortness of air, Wheezing, Cough, Hemoptysis,      Pleuritic pain, Other      Endocrine:  Denies: Polydipsia, Polyuria, Heat/cold intolerance, Abnorml mens    trual pattern, Diabetes, Other      Eyes:  Denies: Blurred vision, Vision Changes, Other      Ears, nose, mouth, throat:  Denies: Mouth lesions, Thrush, Throat pain,     Hoarseness, Allergies/Hay Fever, Post Nasal Drip, Headaches, Recent Head Injury,    Nose Bleeding, Neck Stiffness, Thyroid Mass, Hearing Loss, Ear Fullness, Dry     Mouth, Nasal or Sinus Pain, Dry Lips, Nasal discharge, Nasal congestion, Other      Cardiovascular:  Denies: Palpitations, Syncope, Claudication, Chest Pain, Wake     up Gasping for air, Leg Swelling, Irregular Heart Rate, Cyanosis, Dyspnea on     Exertion, Other      Gastrointestinal:  Denies: Nausea, Constipation, Diarrhea, Abdominal pain,     Vomiting, Difficulty Swallowing, Reflux/Heartburn, Dysphagia, Jaundice,     Bloating, Melena, Bloody stools, Other      Genitourinary:  Denies: Urinary frequency, Incontinence, Hematuria, Urgency,     Nocturia, Dysuria, Testicular problems, Other      Musculoskeletal:  Denies: Joint Pain, Joint Stiffness, Joint Swelling, Myalgias,    Other      Hematologic/lymphatic:  DENIES: Lymphadenopathy, Bruising, Bleeding tendencies,     Other      Neurological:  Denies: Headache, Numbness, Weakness, Seizures, Other      Psychiatric:  Denies: Anxiety, Appropriate Effect, Depression, Other      Sleep:  No: Excessive daytime sleep, Morning Headache?, Snoring, Insomnia?, Stop    breathing at sleep?, Other      Integumentary:  Denies: Rash, Dry skin, Skin Warm to Touch, Other      Immunologic/Allergic:  Complains of: Seasonal allergies; Denies: Latex allergy,     Asthma, Urticaria, Eczema, Other      Immunization status:  No: Up to date            FAMILY/SOCIAL/MEDICAL HX      Surgical History:  Yes: Appendectomy, Cholecystectomy, Orthopedic Surgery (Right    knee); No: AAA Repair, Abdominal Surgery, Adenoids, Angioplasty, Back Surgery,     Bladder  Surgery, Bowel Surgery, Breast Surgery, CABG, Carotid Stenosis, Ear     Surgery, Eye Surgery, Head Surgery, Hernia Surgery, Kidney Surgery, Nose     Surgery, Oral Surgery, Prostatectomy, Rectal Surgery, Spinal Surgery, Testicular    Surgery, Throat Surgery, Tonsils, Valve Replacement, Vascular Surgery, Other     Surgeries      Heart - Family Hx:  Father      Cancer/Type - Family Hx:  Mother, Father, Brother, Sister      Other Family Medical History:  Mother, Father      Is Father Still Living?:  No      Is Mother Still Living?:  No       Family History:  Yes      Social History:  No Tobacco Use, No Alcohol Use, No Recreational Drug use      Smoking status:  Former smoker (smoked a little, quit 47 years ago)      Anticoagulation Therapy:  Yes      Antibiotic Prophylaxis:  No      Medical History:  Yes: Allergies, Arthritis, Asthma, Chronic Bronchitis/COPD,     Diabetes, Heart Attack, High Blood Pressure, High Cholesterol, Reflux Disease,     Thyroid Problem, Miscellaneous Medical/oth (afib); No: Alcoholism, Anemia,     Atrial Fibrillation, Blood Disease, Broken Bones, Cataracts, Chemical     Dependency, Chemotherapy/Cancer, Emphysema, Chronic Liver Disease, Colon     Trouble, Colitis, Diverticulitis, Congestive Heart Failu, Deafness or Ringing     Ears, Convulsions, Depression, Anxiety, Bipolar Disorder, PTSD, Epilepsy,     Seizures, Forgetfullness, Glaucoma, Gall Stones, Gout, Head Injury, Heart     Murmur, GERD, Hemorrhoids/Rectal Prob, Hepatitis, Hiatal Hernia, HIV (Do not ask    - volu, Jaundice, Kidney or Bladder Disease, Kidney Stones, Migrane Headaches,     Mitral Valve Prolapse, Night sweats, Phlebitis, Psychiatric Care, Rheumatic     Fever, Sexually Transmitted Dis, Shortness Of Breath, Sinus Trouble, Skin     Disease/Psoriais/Ecz, Stroke, Tuberculosis or Pos TB Te      Psychiatric History      none            PREVENTION      Hx Influenza Vaccination:  Yes      Date Influenza Vaccine Given:  Oct 1, 2017       Influenza Vaccine Declined:  No      2 or More Falls Past Year?:  No      Fall Past Year with Injury?:  No      Hx Pneumococcal Vaccination:  Yes      Encouraged to follow-up with:  PCP regarding preventative exams.      Chart initiated by      nigel macias ma            ALLERGIES/MEDICATIONS      Allergies:        Coded Allergies:             CEPHALEXIN (Verified  Allergy, Intermediate, vomiting, 9/27/18)           CHLORPHENIRAMINE (Verified  Allergy, Intermediate, 9/27/18)           CODEINE (Verified  Allergy, Intermediate, swelling, 9/27/18)           HYDROCODONE (Verified  Allergy, Intermediate, 9/27/18)           NALBUPHINE (Verified  Allergy, Intermediate, breaks out, 9/27/18)           PENICILLINS (Verified  Allergy, Intermediate, swellling, 9/27/18)      Medications    Last Reconciled on 9/27/18 10:14 by KETURAH SEQUEIRA MD      Arformoterol Tartrate (Brovana) 15 Mcg/2 Ml Vial.neb      15 MCG INH RTBID, #60 NEB 11 Refills         Prov: Keturah Sequeira         9/27/18       Montelukast Sodium (Singulair*) 10 Mg Tab      10 MG PO HS, #30 TAB 11 Refills         Prov: Keturah Sequeira         9/27/18       Aspirin (Aspirin*) 81 Mg Tab.chew      81 MG PO QDAY, #30 TAB.CHEW 0 Refills         Reported         9/27/18       Neb-Budesonide (Pulmicort) 0.5 Mg/2 Ml Ampul.neb      0.5 MG INH RTBID, #60 NEB 6 Refills         Prov: Keturah Sequeira         7/25/18       Glycopyrrol/Nebulizer/Accessor (Lonhala Magnair 25 Mcg Starter) 25 Mcg/1 Ml     Vial.neb      MCG INH RTBID, #60         Reported         7/25/18       Arformoterol Tartrate (Brovana) 15 Mcg/2 Ml Vial.neb      15 MCG INH RTBID, #60 NEB         Reported         7/25/18       Cyanocobalamin (Vitamin B-12*) 1,000 Mcg Tablet.er      1000 MCG PO QDAY, #30 TAB.ER         Reported         7/25/18       Apixaban (Eliquis) 5 Mg Tablet      5 MG PO BID for 30 Days, #60 TAB         Reported         7/25/18       Albuterol/Ipratropium (Duoneb) 3 Ml Ampul.neb      3 ML  INH Q4H PRN for SHORTNESS OF BREATH, #120 NEB 0 Refills         Reported         7/25/18       Liraglutide (Victoza Inj) 0.1 Ml Cartridge      18 MG SQ QDAY, EA         Reported         7/25/18       Ezetimide (Zetia) 10 Mg Tablet      10 MG PO HS, #30 TAB 0 Refills         Reported         7/25/18       Ranitidine HCl (Zantac*) 150 Mg Tablet      150 MG PO HS for 30 Days, #30 TAB         Reported         7/25/18       Nitroglycerin (Nitrostat*) 0.4 Mg Tablet      0.4 MG SL ASDIR, #25 TAB 1 Refill         Reported         7/25/18       Metoprolol Succinate (Metoprolol Succinate*) 50 Mg Tab.er.24h      25 MG PO BID, #30 TAB.SR.24H 0 Refills         Reported         7/25/18       Loratadine (Loratadine) 10 Mg Tablet      10 MG PO QDAY, #30 TAB 0 Refills         Reported         7/25/18       Lisinopril/HCTZ 20/25 MG (Lisinopril/HCTZ 20/25 MG*) 1 Each Tablet      0.5 TAB PO QDAY, #30 TAB 0 Refills         Reported         7/25/18       Levothyroxine Sodium (Levoxyl*) 0.05 Mg Tablet      0.05 MG PO QDAY@07, #30 TAB 0 Refills         Reported         7/25/18       Gabapentin (Gabapentin) 400 Mg Capsule      400 MG PO TID, #90 CAP 0 Refills         Reported         7/25/18       Hunter-Fluticasone (Fluticasone 50 mcg) 16 Gm Spray.susp      2 PUFFS NARE EACH QDAY, #1 BOTTLE 0 Refills         Reported         7/25/18       Atorvastatin (Atorvastatin) 20 Mg Tablet      20 MG PO HS, #30 TAB 0 Refills         Reported         7/25/18      Current Medications      Current Medications Reviewed 9/27/18            EXAM      GEN-patient appears stated age resting comfortable in no acute distress      Eyes-PERRL,  conjunctiva are normal in appearance extraocular muscles are     intact, no scleral icterus      Nasal-both nares are patent turbinates appear normal no polyps seen no nasal     discharge or ulcerations      Ears-tympanic membranes are normal no erythema no bulging, normal to inspection      Lymphatic-no swollen or enlarged  cervical nodes, or axillary node, or femoral     nodes, or supraclavicular nodes      Mouth normal dentition, no erythema no ulcerations oropharynx appears normal no     exudate no evidence of postnasal drip, MP(2)      Neck-there are no palpable supraclavicular or cervical adenopathy, thyroid is     normal in appearance no apparent nodules, there is no inspiratory or expiratory     stridor      Respiratory-patient has increased AP diameter, exhibits normal work of     breathing, speaking in full sentences without difficulty, the chest is normal in     appearance, clear to auscultation with no wheezes rales or rhonchi, chest is     normal to percussion on both the right and left sides      Cardiovascular-the heart rate is normal and regular S1 and S2 present with no     murmur or extra heart sounds, there is no JVD or pedal edema present      GI-the abdomen is normal in appearance, bowel sounds present and normal in all     quadrants no hepatosplenomegaly or masses felt      Extremities-no clubbing is present, pulses present in all extremities, capillary     refill time is normal      Musculoskeletal-Normal strength in upper and lower extremities, inspection shows     no evidence of muscle atrophy      Skin-skin is normal in appearance it is warm and dry, no rashes present, no     evidence of cyanosis, palpation reveals no masses      Neurological-the patient is alert and oriented to time place and person, moves     all 4 extremities, normal gait, normal affect and mood, CN2-12 intact      Psych-normal judgment and insight is good, normal mood and affect, alert and     oriented to person, place, and time, and date      Vtials      Vitals:             Height 5 ft 1 in / 154.94 cm           Weight 224 lbs 0 oz / 101.250241 kg           BSA 2.15 m2           BMI 42.3 kg/m2           Temperature 98.0 F / 36.67 C - Oral           Pulse 72           Respirations 14           Blood Pressure 136/88 Sitting, Left Arm            Pulse Oximetry 98%, Room air            REVIEW      Results Reviewed      PCCS Results Reviewed?:  Yes Prev Lab Results, Yes Prev Radiology Results, Yes     Previous [facility] Records            Assessment      Hypogammaglobulinemia - D80.1            Notes      New Medications      * Aspirin (Aspirin*) 81 MG TAB.CHEW: 81 MG PO QDAY #30      * Montelukast Sodium (Singulair*) 10 MG TAB: 10 MG PO HS #30      * ARFORMOTEROL TARTRATE (Brovana) 15 MCG/2 ML VIAL.NEB: 15 MCG INH RTBID #60         Instructions: DIAGNOSIS CODE REQUIRED PRIOR TO PRESCRIBING.      New Diagnostics      * IgG SUBCLASSES 1-4  IGGSC, Routine         Dx: Hypogammaglobulinemia - D80.1      * Immuno G (Igg), Routine         Dx: Hypogammaglobulinemia - D80.1      ASSESSMENT:      1. Mild intermittent asthma.       2. Reported chronic obstructive pulmonary disease.       3. Hypogammaglobulinemia.             PLAN:      1. The patient has decreased immunoglobulin levels and subclasses. We will     repeat levels in 3 months and if the patient has persistent symptoms of     recurrent bronchitis and her levels are low again in 3 months we will send her     for allergy and immunology evaluation.       2. Continue Brovana and Pulmicort for chronic obstructive pulmonary disease and     asthma.       3. Continue Lonhala.        4. We will start the patient on Singulair 10 mg PO q HS.       5. We will give the patient Fluzone vaccine in the office today.       6. I have personally reviewed laboratory data, imaging as well as previous     medical records.            Patient Education      Education resources provided:  Yes      Patient Education Provided:  Acute Asthma                 Disclaimer: Converted document may not contain table formatting or lab diagrams. Please see [employer] for the authenticated document.

## 2021-09-26 ENCOUNTER — APPOINTMENT (OUTPATIENT)
Dept: GENERAL RADIOLOGY | Facility: HOSPITAL | Age: 71
End: 2021-09-26

## 2021-09-26 ENCOUNTER — HOSPITAL ENCOUNTER (INPATIENT)
Facility: HOSPITAL | Age: 71
LOS: 1 days | Discharge: HOME HEALTH | End: 2021-09-28
Attending: EMERGENCY MEDICINE | Admitting: INTERNAL MEDICINE

## 2021-09-26 DIAGNOSIS — J96.01 ACUTE RESPIRATORY FAILURE WITH HYPOXIA (HCC): ICD-10-CM

## 2021-09-26 DIAGNOSIS — R26.2 DIFFICULTY IN WALKING: ICD-10-CM

## 2021-09-26 DIAGNOSIS — Z78.9 DECREASED ACTIVITIES OF DAILY LIVING (ADL): ICD-10-CM

## 2021-09-26 DIAGNOSIS — J18.9 MULTIFOCAL PNEUMONIA: Primary | ICD-10-CM

## 2021-09-26 DIAGNOSIS — J12.82 PNEUMONIA DUE TO COVID-19 VIRUS: ICD-10-CM

## 2021-09-26 DIAGNOSIS — U07.1 PNEUMONIA DUE TO COVID-19 VIRUS: ICD-10-CM

## 2021-09-26 LAB
ALBUMIN SERPL-MCNC: 3.4 G/DL (ref 3.5–5.2)
ALBUMIN/GLOB SERPL: 1.2 G/DL
ALP SERPL-CCNC: 85 U/L (ref 39–117)
ALT SERPL W P-5'-P-CCNC: 31 U/L (ref 1–33)
ANION GAP SERPL CALCULATED.3IONS-SCNC: 16.8 MMOL/L (ref 5–15)
AST SERPL-CCNC: 77 U/L (ref 1–32)
BASOPHILS # BLD AUTO: 0.01 10*3/MM3 (ref 0–0.2)
BASOPHILS NFR BLD AUTO: 0.3 % (ref 0–1.5)
BILIRUB SERPL-MCNC: 0.8 MG/DL (ref 0–1.2)
BUN SERPL-MCNC: 21 MG/DL (ref 8–23)
BUN/CREAT SERPL: 14.7 (ref 7–25)
CALCIUM SPEC-SCNC: 8.9 MG/DL (ref 8.6–10.5)
CHLORIDE SERPL-SCNC: 105 MMOL/L (ref 98–107)
CO2 SERPL-SCNC: 16.2 MMOL/L (ref 22–29)
CREAT SERPL-MCNC: 1.43 MG/DL (ref 0.57–1)
D-LACTATE SERPL-SCNC: 1.2 MMOL/L (ref 0.5–2)
DEPRECATED RDW RBC AUTO: 47.3 FL (ref 37–54)
EOSINOPHIL # BLD AUTO: 0.02 10*3/MM3 (ref 0–0.4)
EOSINOPHIL NFR BLD AUTO: 0.5 % (ref 0.3–6.2)
ERYTHROCYTE [DISTWIDTH] IN BLOOD BY AUTOMATED COUNT: 14.1 % (ref 12.3–15.4)
FLUAV AG NPH QL: NEGATIVE
FLUBV AG NPH QL IA: NEGATIVE
GFR SERPL CREATININE-BSD FRML MDRD: 36 ML/MIN/1.73
GLOBULIN UR ELPH-MCNC: 2.9 GM/DL
GLUCOSE SERPL-MCNC: 112 MG/DL (ref 65–99)
HCT VFR BLD AUTO: 37.6 % (ref 34–46.6)
HGB BLD-MCNC: 12.2 G/DL (ref 12–15.9)
HOLD SPECIMEN: NORMAL
HOLD SPECIMEN: NORMAL
IMM GRANULOCYTES # BLD AUTO: 0.04 10*3/MM3 (ref 0–0.05)
IMM GRANULOCYTES NFR BLD AUTO: 1.1 % (ref 0–0.5)
LYMPHOCYTES # BLD AUTO: 1.15 10*3/MM3 (ref 0.7–3.1)
LYMPHOCYTES NFR BLD AUTO: 30.7 % (ref 19.6–45.3)
MCH RBC QN AUTO: 29.4 PG (ref 26.6–33)
MCHC RBC AUTO-ENTMCNC: 32.4 G/DL (ref 31.5–35.7)
MCV RBC AUTO: 90.6 FL (ref 79–97)
MONOCYTES # BLD AUTO: 0.32 10*3/MM3 (ref 0.1–0.9)
MONOCYTES NFR BLD AUTO: 8.5 % (ref 5–12)
NEUTROPHILS NFR BLD AUTO: 2.21 10*3/MM3 (ref 1.7–7)
NEUTROPHILS NFR BLD AUTO: 58.9 % (ref 42.7–76)
NRBC BLD AUTO-RTO: 0 /100 WBC (ref 0–0.2)
NT-PROBNP SERPL-MCNC: 609.5 PG/ML (ref 0–900)
PLATELET # BLD AUTO: 214 10*3/MM3 (ref 140–450)
PMV BLD AUTO: 10.9 FL (ref 6–12)
POTASSIUM SERPL-SCNC: 4.1 MMOL/L (ref 3.5–5.2)
PROCALCITONIN SERPL-MCNC: 0.24 NG/ML (ref 0–0.25)
PROT SERPL-MCNC: 6.3 G/DL (ref 6–8.5)
RBC # BLD AUTO: 4.15 10*6/MM3 (ref 3.77–5.28)
SODIUM SERPL-SCNC: 138 MMOL/L (ref 136–145)
TROPONIN T SERPL-MCNC: 0.01 NG/ML (ref 0–0.03)
WBC # BLD AUTO: 3.75 10*3/MM3 (ref 3.4–10.8)
WHOLE BLOOD HOLD SPECIMEN: NORMAL
WHOLE BLOOD HOLD SPECIMEN: NORMAL

## 2021-09-26 PROCEDURE — 84145 PROCALCITONIN (PCT): CPT | Performed by: EMERGENCY MEDICINE

## 2021-09-26 PROCEDURE — 87040 BLOOD CULTURE FOR BACTERIA: CPT | Performed by: EMERGENCY MEDICINE

## 2021-09-26 PROCEDURE — 25010000002 CEFTRIAXONE PER 250 MG: Performed by: EMERGENCY MEDICINE

## 2021-09-26 PROCEDURE — 71045 X-RAY EXAM CHEST 1 VIEW: CPT

## 2021-09-26 PROCEDURE — 83880 ASSAY OF NATRIURETIC PEPTIDE: CPT | Performed by: EMERGENCY MEDICINE

## 2021-09-26 PROCEDURE — 25010000002 AZITHROMYCIN PER 500 MG: Performed by: EMERGENCY MEDICINE

## 2021-09-26 PROCEDURE — 84484 ASSAY OF TROPONIN QUANT: CPT | Performed by: EMERGENCY MEDICINE

## 2021-09-26 PROCEDURE — 87804 INFLUENZA ASSAY W/OPTIC: CPT | Performed by: EMERGENCY MEDICINE

## 2021-09-26 PROCEDURE — 83605 ASSAY OF LACTIC ACID: CPT | Performed by: EMERGENCY MEDICINE

## 2021-09-26 PROCEDURE — 99285 EMERGENCY DEPT VISIT HI MDM: CPT

## 2021-09-26 PROCEDURE — 85379 FIBRIN DEGRADATION QUANT: CPT | Performed by: PHYSICIAN ASSISTANT

## 2021-09-26 PROCEDURE — 93010 ELECTROCARDIOGRAM REPORT: CPT | Performed by: INTERNAL MEDICINE

## 2021-09-26 PROCEDURE — 94799 UNLISTED PULMONARY SVC/PX: CPT

## 2021-09-26 PROCEDURE — 93005 ELECTROCARDIOGRAM TRACING: CPT | Performed by: EMERGENCY MEDICINE

## 2021-09-26 PROCEDURE — 25010000002 DEXAMETHASONE PER 1 MG: Performed by: EMERGENCY MEDICINE

## 2021-09-26 PROCEDURE — 85025 COMPLETE CBC W/AUTO DIFF WBC: CPT | Performed by: EMERGENCY MEDICINE

## 2021-09-26 PROCEDURE — 94640 AIRWAY INHALATION TREATMENT: CPT

## 2021-09-26 PROCEDURE — 80053 COMPREHEN METABOLIC PANEL: CPT | Performed by: EMERGENCY MEDICINE

## 2021-09-26 PROCEDURE — 25010000002 ONDANSETRON PER 1 MG: Performed by: EMERGENCY MEDICINE

## 2021-09-26 RX ORDER — LORATADINE 10 MG/1
10 TABLET ORAL DAILY
COMMUNITY

## 2021-09-26 RX ORDER — EZETIMIBE 10 MG/1
10 TABLET ORAL DAILY
COMMUNITY

## 2021-09-26 RX ORDER — LEVOTHYROXINE SODIUM 0.07 MG/1
88 TABLET ORAL DAILY
COMMUNITY
End: 2022-05-12 | Stop reason: SDUPTHER

## 2021-09-26 RX ORDER — GLYCOPYRROLATE 25 UG/ML
2 SOLUTION RESPIRATORY (INHALATION) 2 TIMES DAILY
COMMUNITY

## 2021-09-26 RX ORDER — ARFORMOTEROL TARTRATE 15 UG/2ML
15 SOLUTION RESPIRATORY (INHALATION)
COMMUNITY
End: 2022-05-12

## 2021-09-26 RX ORDER — SODIUM CHLORIDE 0.9 % (FLUSH) 0.9 %
10 SYRINGE (ML) INJECTION AS NEEDED
Status: DISCONTINUED | OUTPATIENT
Start: 2021-09-26 | End: 2021-09-28 | Stop reason: HOSPADM

## 2021-09-26 RX ORDER — IPRATROPIUM BROMIDE AND ALBUTEROL SULFATE 2.5; .5 MG/3ML; MG/3ML
3 SOLUTION RESPIRATORY (INHALATION) EVERY 4 HOURS PRN
COMMUNITY
End: 2021-11-17

## 2021-09-26 RX ORDER — MONTELUKAST SODIUM 10 MG/1
10 TABLET ORAL NIGHTLY
COMMUNITY

## 2021-09-26 RX ORDER — FAMOTIDINE 20 MG/1
20 TABLET, FILM COATED ORAL
Status: ON HOLD | COMMUNITY
End: 2021-09-27

## 2021-09-26 RX ORDER — GABAPENTIN 400 MG/1
400 CAPSULE ORAL 3 TIMES DAILY
COMMUNITY

## 2021-09-26 RX ORDER — ALBUTEROL SULFATE 90 UG/1
2 AEROSOL, METERED RESPIRATORY (INHALATION) EVERY 4 HOURS PRN
COMMUNITY
End: 2022-05-12 | Stop reason: SDUPTHER

## 2021-09-26 RX ORDER — ONDANSETRON 2 MG/ML
4 INJECTION INTRAMUSCULAR; INTRAVENOUS ONCE
Status: COMPLETED | OUTPATIENT
Start: 2021-09-26 | End: 2021-09-26

## 2021-09-26 RX ORDER — LIDOCAINE HYDROCHLORIDE 10 MG/ML
5 INJECTION, SOLUTION EPIDURAL; INFILTRATION; INTRACAUDAL; PERINEURAL ONCE
Status: COMPLETED | OUTPATIENT
Start: 2021-09-26 | End: 2021-09-26

## 2021-09-26 RX ORDER — DEXAMETHASONE SODIUM PHOSPHATE 10 MG/ML
10 INJECTION INTRAMUSCULAR; INTRAVENOUS ONCE
Status: COMPLETED | OUTPATIENT
Start: 2021-09-26 | End: 2021-09-26

## 2021-09-26 RX ORDER — IPRATROPIUM BROMIDE AND ALBUTEROL SULFATE 2.5; .5 MG/3ML; MG/3ML
3 SOLUTION RESPIRATORY (INHALATION)
Status: COMPLETED | OUTPATIENT
Start: 2021-09-26 | End: 2021-09-26

## 2021-09-26 RX ORDER — CEFTRIAXONE SODIUM 1 G/50ML
1 INJECTION, SOLUTION INTRAVENOUS ONCE
Status: COMPLETED | OUTPATIENT
Start: 2021-09-26 | End: 2021-09-26

## 2021-09-26 RX ORDER — BUDESONIDE 0.5 MG/2ML
0.5 INHALANT ORAL 2 TIMES DAILY
COMMUNITY
End: 2022-05-12 | Stop reason: SDUPTHER

## 2021-09-26 RX ADMIN — CEFTRIAXONE SODIUM 1 G: 1 INJECTION, SOLUTION INTRAVENOUS at 22:49

## 2021-09-26 RX ADMIN — LIDOCAINE HYDROCHLORIDE 5 ML: 10 INJECTION, SOLUTION EPIDURAL; INFILTRATION; INTRACAUDAL; PERINEURAL at 23:50

## 2021-09-26 RX ADMIN — AZITHROMYCIN 500 MG: 500 INJECTION, POWDER, LYOPHILIZED, FOR SOLUTION INTRAVENOUS at 23:24

## 2021-09-26 RX ADMIN — DEXAMETHASONE SODIUM PHOSPHATE 10 MG: 10 INJECTION INTRAMUSCULAR; INTRAVENOUS at 21:58

## 2021-09-26 RX ADMIN — IPRATROPIUM BROMIDE AND ALBUTEROL SULFATE 3 ML: .5; 3 SOLUTION RESPIRATORY (INHALATION) at 22:05

## 2021-09-26 RX ADMIN — ONDANSETRON 4 MG: 2 INJECTION INTRAMUSCULAR; INTRAVENOUS at 21:58

## 2021-09-26 RX ADMIN — IPRATROPIUM BROMIDE AND ALBUTEROL SULFATE 3 ML: .5; 3 SOLUTION RESPIRATORY (INHALATION) at 22:04

## 2021-09-26 RX ADMIN — IPRATROPIUM BROMIDE AND ALBUTEROL SULFATE 3 ML: .5; 3 SOLUTION RESPIRATORY (INHALATION) at 22:03

## 2021-09-27 PROBLEM — J96.01 ACUTE RESPIRATORY FAILURE WITH HYPOXIA: Status: ACTIVE | Noted: 2021-09-27

## 2021-09-27 LAB
D DIMER PPP FEU-MCNC: 0.52 MG/L (FEU) (ref 0–0.59)
FERRITIN SERPL-MCNC: 1335 NG/ML (ref 13–150)
GLUCOSE BLDC GLUCOMTR-MCNC: 181 MG/DL (ref 70–99)
GLUCOSE BLDC GLUCOMTR-MCNC: 188 MG/DL (ref 70–99)
L PNEUMO1 AG UR QL IA: NEGATIVE
LDH SERPL-CCNC: 369 U/L (ref 135–214)
QT INTERVAL: 389 MS
S PNEUM AG SPEC QL LA: NEGATIVE

## 2021-09-27 PROCEDURE — 94799 UNLISTED PULMONARY SVC/PX: CPT

## 2021-09-27 PROCEDURE — 25010000002 CEFTRIAXONE PER 250 MG: Performed by: PHYSICIAN ASSISTANT

## 2021-09-27 PROCEDURE — 25010000002 AZITHROMYCIN PER 500 MG: Performed by: PHYSICIAN ASSISTANT

## 2021-09-27 PROCEDURE — 63710000001 DEXAMETHASONE PER 0.25 MG: Performed by: PHYSICIAN ASSISTANT

## 2021-09-27 PROCEDURE — 99233 SBSQ HOSP IP/OBS HIGH 50: CPT | Performed by: INTERNAL MEDICINE

## 2021-09-27 PROCEDURE — 63710000001 INSULIN LISPRO (HUMAN) PER 5 UNITS: Performed by: INTERNAL MEDICINE

## 2021-09-27 PROCEDURE — 83615 LACTATE (LD) (LDH) ENZYME: CPT | Performed by: PHYSICIAN ASSISTANT

## 2021-09-27 PROCEDURE — 94640 AIRWAY INHALATION TREATMENT: CPT

## 2021-09-27 PROCEDURE — 82962 GLUCOSE BLOOD TEST: CPT

## 2021-09-27 PROCEDURE — 94760 N-INVAS EAR/PLS OXIMETRY 1: CPT

## 2021-09-27 PROCEDURE — 87899 AGENT NOS ASSAY W/OPTIC: CPT | Performed by: PHYSICIAN ASSISTANT

## 2021-09-27 PROCEDURE — 82728 ASSAY OF FERRITIN: CPT | Performed by: PHYSICIAN ASSISTANT

## 2021-09-27 PROCEDURE — 99223 1ST HOSP IP/OBS HIGH 75: CPT | Performed by: PHYSICIAN ASSISTANT

## 2021-09-27 RX ORDER — ALBUTEROL SULFATE 2.5 MG/3ML
2.5 SOLUTION RESPIRATORY (INHALATION) ONCE AS NEEDED
Status: DISCONTINUED | OUTPATIENT
Start: 2021-09-27 | End: 2021-09-28 | Stop reason: HOSPADM

## 2021-09-27 RX ORDER — FAMOTIDINE 20 MG/1
20 TABLET, FILM COATED ORAL
Status: DISCONTINUED | OUTPATIENT
Start: 2021-09-27 | End: 2021-09-28 | Stop reason: HOSPADM

## 2021-09-27 RX ORDER — NICOTINE POLACRILEX 4 MG
15 LOZENGE BUCCAL
Status: DISCONTINUED | OUTPATIENT
Start: 2021-09-27 | End: 2021-09-28 | Stop reason: HOSPADM

## 2021-09-27 RX ORDER — DEXAMETHASONE SODIUM PHOSPHATE 10 MG/ML
6 INJECTION INTRAMUSCULAR; INTRAVENOUS DAILY
Status: DISCONTINUED | OUTPATIENT
Start: 2021-09-27 | End: 2021-09-28 | Stop reason: HOSPADM

## 2021-09-27 RX ORDER — OXYMETAZOLINE HYDROCHLORIDE 0.05 G/100ML
1 SPRAY NASAL 2 TIMES DAILY
Status: DISCONTINUED | OUTPATIENT
Start: 2021-09-27 | End: 2021-09-28 | Stop reason: HOSPADM

## 2021-09-27 RX ORDER — LEVOTHYROXINE SODIUM 0.07 MG/1
75 TABLET ORAL
Status: DISCONTINUED | OUTPATIENT
Start: 2021-09-27 | End: 2021-09-28 | Stop reason: HOSPADM

## 2021-09-27 RX ORDER — ARFORMOTEROL TARTRATE 15 UG/2ML
15 SOLUTION RESPIRATORY (INHALATION)
Status: DISCONTINUED | OUTPATIENT
Start: 2021-09-27 | End: 2021-09-28 | Stop reason: HOSPADM

## 2021-09-27 RX ORDER — BUDESONIDE 0.5 MG/2ML
0.5 INHALANT ORAL
Status: DISCONTINUED | OUTPATIENT
Start: 2021-09-27 | End: 2021-09-28 | Stop reason: HOSPADM

## 2021-09-27 RX ORDER — OXYBUTYNIN CHLORIDE 5 MG/1
10 TABLET, EXTENDED RELEASE ORAL DAILY
Status: DISCONTINUED | OUTPATIENT
Start: 2021-09-27 | End: 2021-09-28 | Stop reason: HOSPADM

## 2021-09-27 RX ORDER — ONDANSETRON 2 MG/ML
4 INJECTION INTRAMUSCULAR; INTRAVENOUS EVERY 4 HOURS PRN
Status: DISCONTINUED | OUTPATIENT
Start: 2021-09-27 | End: 2021-09-28 | Stop reason: HOSPADM

## 2021-09-27 RX ORDER — CEFTRIAXONE SODIUM 1 G/50ML
1 INJECTION, SOLUTION INTRAVENOUS EVERY 24 HOURS
Status: DISCONTINUED | OUTPATIENT
Start: 2021-09-27 | End: 2021-09-28 | Stop reason: HOSPADM

## 2021-09-27 RX ORDER — IPRATROPIUM BROMIDE AND ALBUTEROL SULFATE 2.5; .5 MG/3ML; MG/3ML
3 SOLUTION RESPIRATORY (INHALATION)
Status: DISCONTINUED | OUTPATIENT
Start: 2021-09-27 | End: 2021-09-28 | Stop reason: HOSPADM

## 2021-09-27 RX ORDER — SODIUM CHLORIDE FOR INHALATION 3 %
4 VIAL, NEBULIZER (ML) INHALATION ONCE AS NEEDED
Status: DISCONTINUED | OUTPATIENT
Start: 2021-09-27 | End: 2021-09-28 | Stop reason: HOSPADM

## 2021-09-27 RX ORDER — CETIRIZINE HYDROCHLORIDE 10 MG/1
10 TABLET ORAL DAILY
Status: DISCONTINUED | OUTPATIENT
Start: 2021-09-27 | End: 2021-09-28 | Stop reason: HOSPADM

## 2021-09-27 RX ORDER — DEXTROSE MONOHYDRATE 100 MG/ML
25 INJECTION, SOLUTION INTRAVENOUS
Status: DISCONTINUED | OUTPATIENT
Start: 2021-09-27 | End: 2021-09-28 | Stop reason: HOSPADM

## 2021-09-27 RX ORDER — MONTELUKAST SODIUM 10 MG/1
10 TABLET ORAL NIGHTLY
Status: DISCONTINUED | OUTPATIENT
Start: 2021-09-27 | End: 2021-09-28 | Stop reason: HOSPADM

## 2021-09-27 RX ADMIN — REMDESIVIR 200 MG: 100 INJECTION, POWDER, LYOPHILIZED, FOR SOLUTION INTRAVENOUS at 06:02

## 2021-09-27 RX ADMIN — APIXABAN 5 MG: 5 TABLET, FILM COATED ORAL at 20:34

## 2021-09-27 RX ADMIN — DEXAMETHASONE 6 MG: 0.5 TABLET ORAL at 08:41

## 2021-09-27 RX ADMIN — IPRATROPIUM BROMIDE AND ALBUTEROL SULFATE 3 ML: .5; 2.5 SOLUTION RESPIRATORY (INHALATION) at 15:31

## 2021-09-27 RX ADMIN — APIXABAN 5 MG: 5 TABLET, FILM COATED ORAL at 08:41

## 2021-09-27 RX ADMIN — OXYBUTYNIN CHLORIDE 10 MG: 5 TABLET, EXTENDED RELEASE ORAL at 18:35

## 2021-09-27 RX ADMIN — METOPROLOL TARTRATE 25 MG: 25 TABLET, FILM COATED ORAL at 20:34

## 2021-09-27 RX ADMIN — IPRATROPIUM BROMIDE AND ALBUTEROL SULFATE 3 ML: .5; 2.5 SOLUTION RESPIRATORY (INHALATION) at 07:35

## 2021-09-27 RX ADMIN — BENZOCAINE AND MENTHOL 1 LOZENGE: 15; 3.6 LOZENGE ORAL at 12:05

## 2021-09-27 RX ADMIN — IPRATROPIUM BROMIDE AND ALBUTEROL SULFATE 3 ML: .5; 2.5 SOLUTION RESPIRATORY (INHALATION) at 18:46

## 2021-09-27 RX ADMIN — Medication 1 SPRAY: at 20:34

## 2021-09-27 RX ADMIN — METOPROLOL TARTRATE 25 MG: 25 TABLET, FILM COATED ORAL at 08:41

## 2021-09-27 RX ADMIN — BUDESONIDE 0.5 MG: 0.5 INHALANT ORAL at 18:46

## 2021-09-27 RX ADMIN — IPRATROPIUM BROMIDE AND ALBUTEROL SULFATE 3 ML: .5; 2.5 SOLUTION RESPIRATORY (INHALATION) at 11:48

## 2021-09-27 RX ADMIN — LEVOTHYROXINE SODIUM 75 MCG: 75 TABLET ORAL at 06:03

## 2021-09-27 RX ADMIN — AZITHROMYCIN MONOHYDRATE 500 MG: 500 INJECTION, POWDER, LYOPHILIZED, FOR SOLUTION INTRAVENOUS at 08:42

## 2021-09-27 RX ADMIN — CETIRIZINE HYDROCHLORIDE 10 MG: 10 TABLET, FILM COATED ORAL at 18:36

## 2021-09-27 RX ADMIN — INSULIN LISPRO 3 UNITS: 100 INJECTION, SOLUTION INTRAVENOUS; SUBCUTANEOUS at 18:35

## 2021-09-27 RX ADMIN — ARFORMOTEROL TARTRATE 15 MCG: 15 SOLUTION RESPIRATORY (INHALATION) at 18:46

## 2021-09-27 RX ADMIN — MONTELUKAST 10 MG: 10 TABLET, FILM COATED ORAL at 20:34

## 2021-09-27 RX ADMIN — Medication 1 SPRAY: at 12:04

## 2021-09-27 RX ADMIN — FAMOTIDINE 20 MG: 20 TABLET, FILM COATED ORAL at 17:49

## 2021-09-27 RX ADMIN — CEFTRIAXONE SODIUM 1 G: 1 INJECTION, SOLUTION INTRAVENOUS at 08:42

## 2021-09-27 RX ADMIN — ARFORMOTEROL TARTRATE 15 MCG: 15 SOLUTION RESPIRATORY (INHALATION) at 07:35

## 2021-09-27 RX ADMIN — BUDESONIDE 0.5 MG: 0.5 INHALANT ORAL at 07:35

## 2021-09-28 ENCOUNTER — READMISSION MANAGEMENT (OUTPATIENT)
Dept: CALL CENTER | Facility: HOSPITAL | Age: 71
End: 2021-09-28

## 2021-09-28 VITALS
WEIGHT: 185.85 LBS | DIASTOLIC BLOOD PRESSURE: 79 MMHG | TEMPERATURE: 98.4 F | RESPIRATION RATE: 20 BRPM | HEART RATE: 69 BPM | SYSTOLIC BLOOD PRESSURE: 129 MMHG | OXYGEN SATURATION: 93 % | HEIGHT: 61 IN | BODY MASS INDEX: 35.09 KG/M2

## 2021-09-28 PROBLEM — D89.832 CYTOKINE RELEASE SYNDROME, GRADE 2: Status: ACTIVE | Noted: 2021-09-28

## 2021-09-28 LAB
ALBUMIN SERPL-MCNC: 3.5 G/DL (ref 3.5–5.2)
ALBUMIN/GLOB SERPL: 1.3 G/DL
ALP SERPL-CCNC: 84 U/L (ref 39–117)
ALT SERPL W P-5'-P-CCNC: 34 U/L (ref 1–33)
ANION GAP SERPL CALCULATED.3IONS-SCNC: 14.6 MMOL/L (ref 5–15)
AST SERPL-CCNC: 75 U/L (ref 1–32)
BASOPHILS # BLD AUTO: 0.01 10*3/MM3 (ref 0–0.2)
BASOPHILS NFR BLD AUTO: 0.2 % (ref 0–1.5)
BILIRUB SERPL-MCNC: 0.4 MG/DL (ref 0–1.2)
BUN SERPL-MCNC: 29 MG/DL (ref 8–23)
BUN/CREAT SERPL: 24.4 (ref 7–25)
CALCIUM SPEC-SCNC: 9.3 MG/DL (ref 8.6–10.5)
CHLORIDE SERPL-SCNC: 107 MMOL/L (ref 98–107)
CO2 SERPL-SCNC: 16.4 MMOL/L (ref 22–29)
CREAT SERPL-MCNC: 1.19 MG/DL (ref 0.57–1)
DEPRECATED RDW RBC AUTO: 47 FL (ref 37–54)
EOSINOPHIL # BLD AUTO: 0 10*3/MM3 (ref 0–0.4)
EOSINOPHIL NFR BLD AUTO: 0 % (ref 0.3–6.2)
ERYTHROCYTE [DISTWIDTH] IN BLOOD BY AUTOMATED COUNT: 14.1 % (ref 12.3–15.4)
GFR SERPL CREATININE-BSD FRML MDRD: 45 ML/MIN/1.73
GLOBULIN UR ELPH-MCNC: 2.7 GM/DL
GLUCOSE BLDC GLUCOMTR-MCNC: 163 MG/DL (ref 70–99)
GLUCOSE SERPL-MCNC: 187 MG/DL (ref 65–99)
HCT VFR BLD AUTO: 34.6 % (ref 34–46.6)
HGB BLD-MCNC: 11.4 G/DL (ref 12–15.9)
IMM GRANULOCYTES # BLD AUTO: 0.08 10*3/MM3 (ref 0–0.05)
IMM GRANULOCYTES NFR BLD AUTO: 1.5 % (ref 0–0.5)
LYMPHOCYTES # BLD AUTO: 0.61 10*3/MM3 (ref 0.7–3.1)
LYMPHOCYTES NFR BLD AUTO: 11.4 % (ref 19.6–45.3)
MCH RBC QN AUTO: 29.8 PG (ref 26.6–33)
MCHC RBC AUTO-ENTMCNC: 32.9 G/DL (ref 31.5–35.7)
MCV RBC AUTO: 90.6 FL (ref 79–97)
MONOCYTES # BLD AUTO: 0.29 10*3/MM3 (ref 0.1–0.9)
MONOCYTES NFR BLD AUTO: 5.4 % (ref 5–12)
NEUTROPHILS NFR BLD AUTO: 4.36 10*3/MM3 (ref 1.7–7)
NEUTROPHILS NFR BLD AUTO: 81.5 % (ref 42.7–76)
NRBC BLD AUTO-RTO: 0 /100 WBC (ref 0–0.2)
PLATELET # BLD AUTO: 220 10*3/MM3 (ref 140–450)
PMV BLD AUTO: 10.9 FL (ref 6–12)
POTASSIUM SERPL-SCNC: 4.9 MMOL/L (ref 3.5–5.2)
PROT SERPL-MCNC: 6.2 G/DL (ref 6–8.5)
RBC # BLD AUTO: 3.82 10*6/MM3 (ref 3.77–5.28)
SODIUM SERPL-SCNC: 138 MMOL/L (ref 136–145)
WBC # BLD AUTO: 5.35 10*3/MM3 (ref 3.4–10.8)

## 2021-09-28 PROCEDURE — 97165 OT EVAL LOW COMPLEX 30 MIN: CPT

## 2021-09-28 PROCEDURE — 85025 COMPLETE CBC W/AUTO DIFF WBC: CPT | Performed by: INTERNAL MEDICINE

## 2021-09-28 PROCEDURE — 82962 GLUCOSE BLOOD TEST: CPT

## 2021-09-28 PROCEDURE — 63710000001 INSULIN LISPRO (HUMAN) PER 5 UNITS: Performed by: INTERNAL MEDICINE

## 2021-09-28 PROCEDURE — 25010000002 CEFTRIAXONE PER 250 MG: Performed by: PHYSICIAN ASSISTANT

## 2021-09-28 PROCEDURE — 97161 PT EVAL LOW COMPLEX 20 MIN: CPT

## 2021-09-28 PROCEDURE — 94799 UNLISTED PULMONARY SVC/PX: CPT

## 2021-09-28 PROCEDURE — 25010000002 AZITHROMYCIN PER 500 MG

## 2021-09-28 PROCEDURE — 99239 HOSP IP/OBS DSCHRG MGMT >30: CPT | Performed by: INTERNAL MEDICINE

## 2021-09-28 PROCEDURE — 94760 N-INVAS EAR/PLS OXIMETRY 1: CPT

## 2021-09-28 PROCEDURE — 25010000002 AZITHROMYCIN 500 MG/250 ML: Performed by: INTERNAL MEDICINE

## 2021-09-28 PROCEDURE — 80053 COMPREHEN METABOLIC PANEL: CPT | Performed by: INTERNAL MEDICINE

## 2021-09-28 PROCEDURE — 63710000001 DEXAMETHASONE PER 0.25 MG: Performed by: PHYSICIAN ASSISTANT

## 2021-09-28 RX ORDER — AZITHROMYCIN 250 MG/1
250 TABLET, FILM COATED ORAL DAILY
Qty: 3 TABLET | Refills: 0 | Status: SHIPPED | OUTPATIENT
Start: 2021-09-29 | End: 2021-10-02

## 2021-09-28 RX ORDER — FAMOTIDINE 20 MG/1
20 TABLET, FILM COATED ORAL
Qty: 20 TABLET | Refills: 0 | Status: SHIPPED | OUTPATIENT
Start: 2021-09-28 | End: 2021-10-08

## 2021-09-28 RX ORDER — DEXAMETHASONE 6 MG/1
6 TABLET ORAL DAILY
Qty: 8 TABLET | Refills: 0 | Status: SHIPPED | OUTPATIENT
Start: 2021-09-29 | End: 2021-10-07

## 2021-09-28 RX ADMIN — FAMOTIDINE 20 MG: 20 TABLET, FILM COATED ORAL at 06:02

## 2021-09-28 RX ADMIN — INSULIN LISPRO 3 UNITS: 100 INJECTION, SOLUTION INTRAVENOUS; SUBCUTANEOUS at 08:50

## 2021-09-28 RX ADMIN — BENZOCAINE AND MENTHOL 1 LOZENGE: 15; 3.6 LOZENGE ORAL at 00:40

## 2021-09-28 RX ADMIN — IPRATROPIUM BROMIDE AND ALBUTEROL SULFATE 3 ML: .5; 2.5 SOLUTION RESPIRATORY (INHALATION) at 02:57

## 2021-09-28 RX ADMIN — IPRATROPIUM BROMIDE AND ALBUTEROL SULFATE 3 ML: .5; 2.5 SOLUTION RESPIRATORY (INHALATION) at 00:06

## 2021-09-28 RX ADMIN — ARFORMOTEROL TARTRATE 15 MCG: 15 SOLUTION RESPIRATORY (INHALATION) at 07:26

## 2021-09-28 RX ADMIN — BENZOCAINE AND MENTHOL 1 LOZENGE: 15; 3.6 LOZENGE ORAL at 12:39

## 2021-09-28 RX ADMIN — AZITHROMYCIN MONOHYDRATE 500 MG: 500 INJECTION, POWDER, LYOPHILIZED, FOR SOLUTION INTRAVENOUS at 10:05

## 2021-09-28 RX ADMIN — SODIUM CHLORIDE, PRESERVATIVE FREE 10 ML: 5 INJECTION INTRAVENOUS at 08:50

## 2021-09-28 RX ADMIN — DEXAMETHASONE 6 MG: 0.5 TABLET ORAL at 08:51

## 2021-09-28 RX ADMIN — METOPROLOL TARTRATE 25 MG: 25 TABLET, FILM COATED ORAL at 08:51

## 2021-09-28 RX ADMIN — Medication 1 SPRAY: at 08:52

## 2021-09-28 RX ADMIN — OXYBUTYNIN CHLORIDE 10 MG: 5 TABLET, EXTENDED RELEASE ORAL at 08:51

## 2021-09-28 RX ADMIN — APIXABAN 5 MG: 5 TABLET, FILM COATED ORAL at 08:51

## 2021-09-28 RX ADMIN — LEVOTHYROXINE SODIUM 75 MCG: 75 TABLET ORAL at 06:02

## 2021-09-28 RX ADMIN — CETIRIZINE HYDROCHLORIDE 10 MG: 10 TABLET, FILM COATED ORAL at 08:51

## 2021-09-28 RX ADMIN — IPRATROPIUM BROMIDE AND ALBUTEROL SULFATE 3 ML: .5; 2.5 SOLUTION RESPIRATORY (INHALATION) at 07:26

## 2021-09-28 RX ADMIN — REMDESIVIR 100 MG: 100 INJECTION, POWDER, LYOPHILIZED, FOR SOLUTION INTRAVENOUS at 14:13

## 2021-09-28 RX ADMIN — BUDESONIDE 0.5 MG: 0.5 INHALANT ORAL at 07:26

## 2021-09-28 RX ADMIN — CEFTRIAXONE SODIUM 1 G: 1 INJECTION, SOLUTION INTRAVENOUS at 08:50

## 2021-09-28 RX ADMIN — IPRATROPIUM BROMIDE AND ALBUTEROL SULFATE 3 ML: .5; 2.5 SOLUTION RESPIRATORY (INHALATION) at 14:59

## 2021-09-28 RX ADMIN — IPRATROPIUM BROMIDE AND ALBUTEROL SULFATE 3 ML: .5; 2.5 SOLUTION RESPIRATORY (INHALATION) at 13:06

## 2021-09-28 RX ADMIN — INSULIN LISPRO 3 UNITS: 100 INJECTION, SOLUTION INTRAVENOUS; SUBCUTANEOUS at 12:39

## 2021-09-28 NOTE — OUTREACH NOTE
Prep Survey      Responses   [facility] discharged from?  [facility]   Is LACE score < 7 ?  Yes   Emergency Room discharge w/ pulse ox?  No   Eligibility  Readm Mgmt   Discharge diagnosis  PN r/t Covid,  COPD,  CAD,  DM2   Does the patient have one of the following disease processes/diagnoses(primary or secondary)?  COVID-19   Does the patient have Home health ordered?  No   Is there a DME ordered?  Yes   What DME was ordered?  Home O2 from [facility],  pulse ox provided   Prep survey completed?  Yes          Emeli Ibarra RN

## 2021-09-29 ENCOUNTER — READMISSION MANAGEMENT (OUTPATIENT)
Dept: CALL CENTER | Facility: HOSPITAL | Age: 71
End: 2021-09-29

## 2021-09-29 LAB — GLUCOSE BLDC GLUCOMTR-MCNC: 153 MG/DL (ref 70–99)

## 2021-09-29 NOTE — OUTREACH NOTE
COVID-19 Week 1 Survey      Responses   [facility] patient discharged from?  [facility]   Does the patient have one of the following disease processes/diagnoses(primary or secondary)?  COVID-19   COVID-19 underlying condition?  COPD   Call Number  Call 1   Week 1 Call successful?  Yes   Call start time  1037   Call end time  1045   Discharge diagnosis  PN r/t Covid,  COPD,  CAD,  DM2   Is patient permission given to speak with other caregiver?  Yes   List who call center can speak with  Candi-nurse from    Person spoke with today (if not patient) and relationship  Pt and Candi   Meds reviewed with patient/caregiver?  Yes   Is the patient having any side effects they believe may be caused by any medication additions or changes?  No   Does the patient have all medications ordered at discharge?  Yes   Is the patient taking all medications as directed (includes completed medication regime)?  Yes   Does the patient have a primary care provider?   Yes   Comments regarding PCP  10/13/21 at 9:30 am    Does the patient have an appointment with their PCP or specialist within 7 days of discharge?  Greater than 7 days   What is preventing the patient from scheduling follow up appointments within 7 days of discharge?  -- [unsure]   Nursing Interventions  Verified appointment date/time/provider   Has the patient kept scheduled appointments due by today?  N/A   What is the Home health agency?   [facility]    Has home health visited the patient within 72 hours of discharge?  Yes   Home health comments  HH was present at time of call   What DME was ordered?  Home O2 from [facility],  pulse ox provided   Has all DME been delivered?  No [Pt received pulse ox]   DME interventions  Called DME agency [RN spoke with Ivette. Ivette states O2 will be delivered on 9/29/21. ]   Psychosocial issues?  No   Did the patient receive a copy of their discharge instructions?  Yes   Did the patient receive a copy of COVID-19 specific instructions?  Yes    Nursing interventions  Reviewed instructions with patient   What is the patient's perception of their health status since discharge?  Improving   Does the patient have any of the following symptoms?  Cough   Nursing Interventions  Nurse provided patient education   Pulse Ox monitoring  Intermittent   Pulse Ox device source  Hospital   O2 Sat comments  99% on RA    Is the patient/caregiver able to teach back steps to recovery at home?  Rest and rebuild strength, gradually increase activity, Set small, achievable goals for return to baseline health, Eat a well-balance diet   If the patient is a current smoker, are they able to teach back resources for cessation?  Not a smoker   Is the patient/caregiver able to teach back the hierarchy of who to call/visit for symptoms/problems? PCP, Specialist, Home health nurse, Urgent Care, ED, 911  Yes   Is the patient able to teach back COPD zones?  Yes   Nursing interventions  Education provided on various zones   Patient reports what zone on this call?  Green Zone   Green Zone  Reports doing well, Breathing without shortness of breath   Green Zone interventions:  Take daily medications, Use oxygen as prescribed   COVID-19 call completed?  Yes          Hermila Salcedo RN

## 2021-09-30 ENCOUNTER — READMISSION MANAGEMENT (OUTPATIENT)
Dept: CALL CENTER | Facility: HOSPITAL | Age: 71
End: 2021-09-30

## 2021-09-30 NOTE — OUTREACH NOTE
COVID-19 Week 1 Survey      Responses   [facility] patient discharged from?  [facility]   Does the patient have one of the following disease processes/diagnoses(primary or secondary)?  COVID-19   COVID-19 underlying condition?  COPD   Call Number  Call 2   Week 1 Call successful?  Yes   Call start time  1005   Call end time  1011   Discharge diagnosis  PN r/t Covid,  COPD,  CAD,  DM2   Is the patient taking all medications as directed (includes completed medication regime)?  Yes   Has the patient kept scheduled appointments due by today?  N/A   DME comments  using oxygen prn   Psychosocial issues?  No   Does the patient have any of the following symptoms?  Cough   Pulse Ox monitoring  Intermittent   Pulse Ox device source  Hospital   O2 Sat comments  95% on RA   O2 Sat: education provided  Sat levels, When to seek care   O2 Sat education comments  keep sats above 92%, come to ED if staying low   If the patient is a current smoker, are they able to teach back resources for cessation?  Not a smoker   Patient reports what zone on this call?  Green Zone   Green Zone  Reports doing well, Breathing without shortness of breath, Usual activity and exercise level   Green Zone interventions:  Take daily medications, Use oxygen as prescribed, Avoid indoor/outdoor triggers   COVID-19 call completed?  Yes   Wrap up additional comments  Enc IS and fluids, rest          Miguelina Gilliam RN

## 2021-10-01 ENCOUNTER — READMISSION MANAGEMENT (OUTPATIENT)
Dept: CALL CENTER | Facility: HOSPITAL | Age: 71
End: 2021-10-01

## 2021-10-01 LAB
BACTERIA SPEC AEROBE CULT: NORMAL
BACTERIA SPEC AEROBE CULT: NORMAL

## 2021-10-01 NOTE — OUTREACH NOTE
COVID-19 Week 1 Survey      Responses   [facility] patient discharged from?  [facility]   Does the patient have one of the following disease processes/diagnoses(primary or secondary)?  COVID-19   COVID-19 underlying condition?  COPD   Call Number  Call 2   Week 1 Call successful?  Yes   Call start time  1116   Call end time  1120   Discharge diagnosis  PN r/t Covid,  COPD,  CAD,  DM2   Meds reviewed with patient/caregiver?  Yes   Is the patient having any side effects they believe may be caused by any medication additions or changes?  No   Does the patient have all medications ordered at discharge?  Yes   Is the patient taking all medications as directed (includes completed medication regime)?  Yes   Does the patient have a primary care provider?   Yes   Comments regarding PCP  10/13/21 at 9:30 am    Does the patient have an appointment with their PCP or specialist within 7 days of discharge?  Greater than 7 days   Nursing Interventions  Verified appointment date/time/provider   Has the patient kept scheduled appointments due by today?  N/A   What is the Home health agency?   [facility]    Has home health visited the patient within 72 hours of discharge?  Yes   Home health comments  HH was present at time of call   What DME was ordered?  Home O2 from [facility],  pulse ox provided   Has all DME been delivered?  No   DME comments  using oxygen prn   Psychosocial issues?  No   Did the patient receive a copy of their discharge instructions?  Yes   Did the patient receive a copy of COVID-19 specific instructions?  Yes   Nursing interventions  Reviewed instructions with patient   What is the patient's perception of their health status since discharge?  Improving   Does the patient have any of the following symptoms?  Cough   Pulse Ox monitoring  Intermittent   Pulse Ox device source  Hospital   O2 Sat comments  91-92%  on RA- she uses her 02 as needed   O2 Sat: education provided  Sat levels, Monitoring frequency, When  to seek care   Is the patient/caregiver able to teach back steps to recovery at home?  Rest and rebuild strength, gradually increase activity, Set small, achievable goals for return to baseline health, Eat a well-balance diet   If the patient is a current smoker, are they able to teach back resources for cessation?  Not a smoker   Is the patient/caregiver able to teach back the hierarchy of who to call/visit for symptoms/problems? PCP, Specialist, Home health nurse, Urgent Care, ED, 911  Yes   Is the patient able to teach back COPD zones?  Yes   Nursing interventions  Education provided on various zones   Patient reports what zone on this call?  Green Zone   Green Zone  Reports doing well, Breathing without shortness of breath, Usual activity and exercise level   Green Zone interventions:  Take daily medications, Use oxygen as prescribed, Avoid indoor/outdoor triggers   COVID-19 call completed?  Yes   Wrap up additional comments  Enc IS and fluids, rest          Azar Fregoso RN

## 2021-10-02 ENCOUNTER — READMISSION MANAGEMENT (OUTPATIENT)
Dept: CALL CENTER | Facility: HOSPITAL | Age: 71
End: 2021-10-02

## 2021-10-02 NOTE — OUTREACH NOTE
COVID-19 Week 1 Survey      Responses   [facility] patient discharged from?  [facility]   Does the patient have one of the following disease processes/diagnoses(primary or secondary)?  COVID-19   COVID-19 underlying condition?  COPD   Call Number  Call 3   Week 1 Call successful?  Yes   Call start time  1229   Call end time  1234   Discharge diagnosis  PN r/t Covid,  COPD,  CAD,  DM2   Meds reviewed with patient/caregiver?  Yes   Is the patient having any side effects they believe may be caused by any medication additions or changes?  No   Does the patient have all medications ordered at discharge?  Yes   Is the patient taking all medications as directed (includes completed medication regime)?  Yes   Does the patient have a primary care provider?   Yes   Does the patient have an appointment with their PCP or specialist within 7 days of discharge?  Greater than 7 days   Nursing Interventions  Verified appointment date/time/provider   Has the patient kept scheduled appointments due by today?  N/A   What is the Home health agency?   [facility]     Has home health visited the patient within 72 hours of discharge?  Yes   What DME was ordered?  Home O2 from [employer],  pulse ox provided   Has all DME been delivered?  Yes   Psychosocial issues?  No   Did the patient receive a copy of their discharge instructions?  Yes   Did the patient receive a copy of COVID-19 specific instructions?  Yes   Nursing interventions  Reviewed instructions with patient   What is the patient's perception of their health status since discharge?  Improving   Does the patient have any of the following symptoms?  Cough, Shortness of breath   Nursing Interventions  Nurse provided patient education   Pulse Ox monitoring  Intermittent   Pulse Ox device source  Hospital   O2 Sat comments  Encouraged O2 more often.   O2 Sat: education provided  Sat levels, Monitoring frequency, When to seek care   O2 Sat education comments  IF 90% or below and stays  there call 911.   Is the patient/caregiver able to teach back steps to recovery at home?  Set small, achievable goals for return to baseline health, Rest and rebuild strength, gradually increase activity, Eat a well-balance diet, Make a list of questions for provider's appointment   If the patient is a current smoker, are they able to teach back resources for cessation?  Not a smoker   Is the patient/caregiver able to teach back the hierarchy of who to call/visit for symptoms/problems? PCP, Specialist, Home health nurse, Urgent Care, ED, 911  Yes   Is the patient able to teach back COPD zones?  Yes   Nursing interventions  Education provided on various zones   Patient reports what zone on this call?  Green Zone   Green Zone  Reports doing well, Breathing without shortness of breath, Sleeping well, Appetite is good   Green Zone interventions:  Take daily medications   COVID-19 call completed?  Yes   Wrap up additional comments  Encouraged fluids and protein, rest. Encouraged discussing quarantine release with PCP.          Ginger Logan RN

## 2021-10-06 ENCOUNTER — READMISSION MANAGEMENT (OUTPATIENT)
Dept: CALL CENTER | Facility: HOSPITAL | Age: 71
End: 2021-10-06

## 2021-10-06 NOTE — OUTREACH NOTE
COVID-19 Week 2 Survey      Responses   [facility] patient discharged from?  [facility]   Does the patient have one of the following disease processes/diagnoses(primary or secondary)?  COVID-19   COVID-19 underlying condition?  COPD   Call Number  Call 1   COVID-19 Week 2: Call 1 attempt successful?  No   Discharge diagnosis  PN r/t Covid,  COPD,  CAD,  DM2          Char Robles RN

## 2021-10-07 NOTE — PROGRESS NOTES
"Enter Query Response Below      Query Response:     Yes patient had decreased po intake from covid related illness         If applicable, please update the problem list.     Patient: Lora Pastor        : 1950  Account: [id number]           Admit Date: 2021        Options to Respond to Query:    1. Access the Encounter     a. From the To-Do Side bar, click Respond With Note.     b. Click New Note     c. Answer query within the yellow box.                d. Update the Problem List if applicable.     Dr. Bello:    70 year old patient admitted with COVID 19 pneumonia. Per Discharge Summary, \"Final Diagnosis\" includes \"THAD\". On day of admit patient had a creatinine of 1.43 which improved to 1.19 on . There is no mention of decreased urinary output. There is no other mention of \"THAD\" in the medical record.  Treatment has included Remdesivir IV, Azithromycin IV, Rocephin IV, monitoring labs, Decadron IV, and intake and output. After study, do you feel the diagnosis of THAD was be clinically supported during this admission?    Yes, please include additional clinical indicators: _____  No  Other, please specify: _____  Unable to clinically determine     By submitting this query, we are merely seeking further clarification of documentation to accurately reflect all conditions that you are monitoring, evaluating, treating or that extend the hospitalization or utilize additional resources of care. Please utilize your independent clinical judgment when addressing the question(s) above.     This query and your response, once completed, will be entered into the legal medical record.    Sincerely,  [email or web address] [email or web address]   Clinical Documentation Integrity Program     "

## 2021-11-15 NOTE — PROGRESS NOTES
Primary Care Provider  Darryl Casarez MD     Referring Provider  No ref. provider found     Chief Complaint  HX COVID-19 and Respiratory Distress    Subjective          History of Presenting Illness  Patient is a 71-year-old female, patient of Dr. Gonzalez who was hospitalized at [facility] from September 26, 2021 to September 28, 2021 for COVID-19 pneumonia.  Patient presents for follow-up visit today.  Patient has a past medical history significant for asthma, COPD, CAD, type 2 diabetes who presented with a 4-day history of worsening shortness of breath and found to have COVID-19 pneumonia.  Patient is unvaccinated.  Patient was treated with steroids, remdesivir, and antibiotics with good improvement.  Patient was on 2 L of oxygen per minute via nasal cannula at the time of discharge.  Home health was established.  Patient was also set up with home nebulizer treatments.  Patient's acute kidney injury improved with supportive care and improved p.o. intake.  Patient was discharged in stable condition.  Of note, our group was not consulted during patient's hospital stay.  Patient states that she is feeling better since hospital stay.  Patient states that she still has shortness of breath that is worse with exertion, moderate severity, and improved with rest.  Patient states that she also has a cough and at times it is hard to sleep and would like something to take for cough.  Patient states that she is taking Brovana, Pulmicort, and Lonhala Magnair nebulizer treatments as prescribed and uses albuterol inhaler and duo nebs as needed.  Patient states she is also taking Singulair, Claritin, and Flonase for allergies.  Patient is on 2 L of oxygen per minute via nasal cannula.  Patient denies fever, chills, night sweats, swollen glands in the head and neck, unintentional weight loss, hemoptysis, purulent sputum production, dysphagia, chest pain, palpitations, chest tightness, abdominal pain, nausea,  vomiting, and diarrhea.  Patient also denies any myalgias, changes in sense of taste and/or smell, sore throat, any other coronavirus or flu-like symptoms.  Patient denies any leg swelling, orthopnea, paroxysmal nocturnal dyspnea.  Patient denies any hematuria, hematochezia, hematemesis, and epistaxis.  Patient is able to perform activities of daily living.      Review of Systems   Constitutional: Negative for activity change, appetite change, chills, diaphoresis, fatigue, fever, unexpected weight gain and unexpected weight loss.        Negative for Insomnia   HENT: Negative for congestion (Nasal), mouth sores, nosebleeds, postnasal drip, sore throat, swollen glands and trouble swallowing.         Negative for Thrush  Negative for Hoarseness  Negative for Allergies/Hay Fever  Negative for Recent Head injury  Negative for Ear Fullness  Negative for Nasal or Sinus pain  Negative for Dry lips  Negative for Nasal discharge   Respiratory: Positive for cough, shortness of breath and wheezing (intermittent). Negative for apnea and chest tightness.         Negative for Hemoptysis  Negative for Pleuritic pain   Cardiovascular: Negative for chest pain, palpitations and leg swelling.        Negative for Claudication  Negative for Cyanosis  Negative for Dyspnea on exertion   Gastrointestinal: Negative for abdominal pain, diarrhea, nausea, vomiting and GERD.   Musculoskeletal: Negative for joint swelling and myalgias.        Negative for Joint pain  Negative for Joint stiffness   Skin: Negative for color change, dry skin, pallor and rash.   Neurological: Negative for syncope, weakness and headache.   Hematological: Negative for adenopathy. Does not bruise/bleed easily.        History reviewed. No pertinent family history.     Social History     Socioeconomic History   • Marital status:    Tobacco Use   • Smoking status: Former Smoker   • Smokeless tobacco: Never Used   Vaping Use   • Vaping Use: Never used   Substance and  Sexual Activity   • Alcohol use: Never   • Drug use: Never        Past Medical History:   Diagnosis Date   • Asthma    • COPD (chronic obstructive pulmonary disease) ([facility])    • Coronary artery disease    • Diabetes mellitus ([facility])    • History of coronary angioplasty with insertion of stent           There is no immunization history on file for this patient.    Allergies   Allergen Reactions   • Codeine Unknown - Low Severity     swells   • Keflex [Cephalexin] Unknown - Low Severity     Causes nausea   • Nubain [Nalbuphine] Swelling   • Penicillins Unknown - Low Severity     Breaks her out          Current Outpatient Medications:   •  albuterol sulfate  (90 Base) MCG/ACT inhaler, Inhale 2 puffs Every 4 (Four) Hours As Needed for Wheezing., Disp: , Rfl:   •  apixaban (ELIQUIS) 5 MG tablet tablet, Take 5 mg by mouth 2 (two) times a day., Disp: , Rfl:   •  arformoterol (BROVANA) 15 MCG/2ML nebulizer solution, Take 15 mcg by nebulization 2 (Two) Times a Day., Disp: , Rfl:   •  budesonide (PULMICORT) 0.5 MG/2ML nebulizer solution, Take 0.5 mg by nebulization 2 (two) times a day., Disp: , Rfl:   •  Diclofenac Sodium (VOLTAREN) 1 % gel gel, Apply  topically to the appropriate area as directed., Disp: , Rfl:   •  ezetimibe (ZETIA) 10 MG tablet, Take 10 mg by mouth Daily., Disp: , Rfl:   •  fluticasone (VERAMYST) 27.5 MCG/SPRAY nasal spray, 2 sprays into the nostril(s) as directed by provider Daily., Disp: , Rfl:   •  gabapentin (NEURONTIN) 400 MG capsule, Take 400 mg by mouth 3 (Three) Times a Day., Disp: , Rfl:   •  Glycopyrrolate (Lonhala Magnair Refill Kit) 25 MCG/ML solution, Inhale 2 puffs 2 (two) times a day., Disp: , Rfl:   •  levothyroxine (SYNTHROID, LEVOTHROID) 75 MCG tablet, Take 88 mcg by mouth Daily., Disp: , Rfl:   •  loratadine (CLARITIN) 10 MG tablet, Take 10 mg by mouth Daily., Disp: , Rfl:   •  metoprolol tartrate (LOPRESSOR) 25 MG tablet, Take 25 mg by mouth 2 (Two) Times a Day., Disp: , Rfl:   •   "Mirabegron ER (Myrbetriq) 50 MG tablet sustained-release 24 hour 24 hr tablet, Take 50 mg by mouth 2 (two) times a day., Disp: , Rfl:   •  montelukast (SINGULAIR) 10 MG tablet, Take 10 mg by mouth Every Night., Disp: , Rfl:   •  Semaglutide,0.25 or 0.5MG/DOS, (OZEMPIC) 2 MG/1.5ML solution pen-injector, Inject 1 mg under the skin into the appropriate area as directed 1 (One) Time Per Week. Monday, Disp: , Rfl:   •  albuterol (PROVENTIL) (2.5 MG/3ML) 0.083% nebulizer solution, Take 2.5 mg by nebulization Every 4 (Four) Hours As Needed for Wheezing or Shortness of Air for up to 30 days., Disp: 180 each, Rfl: 11  •  benzonatate (TESSALON) 100 MG capsule, Take 1 capsule by mouth 3 (Three) Times a Day As Needed for Cough., Disp: 42 capsule, Rfl: 0     Objective     Physical Exam  Vital Signs Reviewed  WDWN, Alert, NAD.    HEENT:  PERRL, EOMI.  OP, nares clear, no sinus tenderness  Neck:  Supple, no JVD, no thyromegaly.  Lymph: no axillary, cervical, supraclavicular lymphadenopathy noted bilaterally  Chest: Mildly decreased breath sounds throughout. No wheezes, rales, or rhonchi appreciated.  Normal work of breathing noted.  Patient is able speak full sentences without difficulty.  Patient is on 2 L of oxygen per minute via nasal cannula.  CV: RRR, no MGR, pulses 2+, equal.  Abd:  Soft, NT, ND, + BS, no HSM  EXT:  no clubbing, no cyanosis, no edema, no joint tenderness  Neuro:  A&Ox3, CN grossly intact, no focal deficits.  Skin: No rashes or lesions noted.    Vital Signs:   /67   Pulse 81   Resp 14   Ht 152.4 cm (60\")   Wt 84.4 kg (186 lb)   SpO2 99% Comment: on 2liters  BMI 36.33 kg/m²         Result Review :   I have personally reviewed discharge summary and imaging study reports from recent hospital stay.  See scanned reports.         Assessment and Plan      Assessment:  1.  COVID-19 pneumonia and unvaccinated patient back in September, 2021.  2.  Hypoxia.  3.  COPD.  4.  Mild intermittent asthma.  5.  " Hypogammaglobulinemia.  6.  Seasonal allergies.  7.  Tobacco abuse of cigarettes in remission.    Plan:  1.  Patient to continue Pulmicort, Lonhala Magnair, and Brovana every day as prescribed and rinse mouth out after each use.  2.  Will stop Duonebs.  Will start patient on albuterol nebulizer treatment to take as needed.  Continue albuterol inhaler as needed.   3.  Continue Claritin, Singulair, and Flonase.  4.  Will order chest CT scan to ensure resolution of pneumonia.  Order placed today.  5.  Patient is advised receive the COVID-19 vaccine 90 days after diagnosis.  Patient states that she will receive her flu vaccine through [facility].  Offered to give patient flu vaccine in the office today however patient declined and prefers to have all her vaccines through [facility].  Patient reports she is up-to-date with her pneumonia vaccines.  Patient is advised to continue to follow CDC recommendations such as social distancing, wearing a mask, and washing hands for least 20 seconds.  6.  Patient to call the office, 911, or go to the ER with new or worsening symptoms.  7.  Follow-up in January, 2022, sooner if needed.          Follow Up   Return for in January, 2022 with MD.  Patient was given instructions and counseling regarding her condition or for health maintenance advice. Please see specific information pulled into the AVS if appropriate.

## 2021-11-15 NOTE — PATIENT INSTRUCTIONS
Things to Know about the COVID-19 Pandemic  Things to Know about the COVID-19 Pandemic  Important Ways to Slow the Spread  · Wear a mask that covers your nose and mouth to help protect yourself and others.  · Stay 6 feet apart from others who don't live with you.  · Get a COVID-19 vaccine when it is available to you.  · Avoid crowds and poorly ventilated indoor spaces.  · Wash your hands often with soap and water. Use hand  if soap and water aren't available.  If you are at risk of getting very sick  · People of any age, even healthy young adults and children, can get COVID-19.  · People who are older or have certain underlying medical conditions are at higher risk of getting very sick from COVID-19.  · Other groups may be at higher risk for getting COVID-19 or having more severe illness.  Getting a COVID-19 Vaccine  · Authorized COVID-19 vaccines can help protect you from COVID-19.  · You should get a COVID-19 vaccine when it is available to you.  · Once you are fully vaccinated, you may be able to start doing some things that you had stopped doing because of the pandemic.  What to do if you're sick  · Stay home except to get medical care. If you have symptoms of COVID-19, contact your healthcare provider and get tested.  · Isolate yourself from others, including those living in your household, to prevent spread to them and the people that they may have contact with, like grandparents.  · Call 911 if you are having emergency warning signs, like trouble breathing, pain or pressure in chest.  How to get a test for current infection  · Visit your state, [place], local, and territorial health department'swebsite to look for the latest local information on testing.  · Talk to your healthcare provider about getting tested. You and your healthcare provider might consider either in-person testing, an at-home collection kit, or an at-home test.  · If you have symptoms of COVID-19, or if you have not been vaccinated  and have been in close contact with someone with COVID-19, it is still important to stay home even if you are not tested.  What symptoms to watch for  The most common symptoms of COVID-19 are  · Fever  · Cough  · Headaches  · Fatigue  · Muscle or body aches  · Loss of taste or smell  · Sore throat  · Nausea  · Diarrhea  Other symptoms are signs of serious illness. If someone has trouble breathing, chest pain or pressure, or difficulty staying awake, get medical care immediately.  I wear a mask because...  CDC staff give their reasons for wearing a mask.  Wear a mask because  Content source: National Center for Immunization and Respiratory Diseases (NCIRD), Division of Viral Diseases  03/17/2021  This information is not intended to replace advice given to you by your health care provider. Make sure you discuss any questions you have with your health care provider.  Document Revised: 04/19/2021 Document Reviewed: 04/19/2021  Elsevier Patient Education © 2021 [employer]

## 2021-11-17 ENCOUNTER — OFFICE VISIT (OUTPATIENT)
Dept: PULMONOLOGY | Facility: CLINIC | Age: 71
End: 2021-11-17

## 2021-11-17 VITALS
RESPIRATION RATE: 14 BRPM | OXYGEN SATURATION: 99 % | WEIGHT: 186 LBS | SYSTOLIC BLOOD PRESSURE: 116 MMHG | HEIGHT: 60 IN | BODY MASS INDEX: 36.52 KG/M2 | DIASTOLIC BLOOD PRESSURE: 67 MMHG | HEART RATE: 81 BPM

## 2021-11-17 DIAGNOSIS — Z86.16 HISTORY OF COVID-19: ICD-10-CM

## 2021-11-17 DIAGNOSIS — D80.1 HYPOGAMMAGLOBULINEMIA (HCC): ICD-10-CM

## 2021-11-17 DIAGNOSIS — J96.01 ACUTE RESPIRATORY FAILURE WITH HYPOXIA (HCC): Primary | ICD-10-CM

## 2021-11-17 DIAGNOSIS — F17.201 TOBACCO ABUSE, IN REMISSION: ICD-10-CM

## 2021-11-17 DIAGNOSIS — J44.9 CHRONIC OBSTRUCTIVE PULMONARY DISEASE, UNSPECIFIED COPD TYPE (HCC): ICD-10-CM

## 2021-11-17 DIAGNOSIS — J45.20 MILD INTERMITTENT ASTHMA, UNSPECIFIED WHETHER COMPLICATED: ICD-10-CM

## 2021-11-17 DIAGNOSIS — J30.2 SEASONAL ALLERGIES: ICD-10-CM

## 2021-11-17 PROCEDURE — 99214 OFFICE O/P EST MOD 30 MIN: CPT | Performed by: NURSE PRACTITIONER

## 2021-11-17 RX ORDER — ALBUTEROL SULFATE 2.5 MG/3ML
2.5 SOLUTION RESPIRATORY (INHALATION) EVERY 4 HOURS PRN
Qty: 180 EACH | Refills: 11 | Status: SHIPPED | OUTPATIENT
Start: 2021-11-17 | End: 2021-12-17

## 2021-11-17 RX ORDER — BENZONATATE 100 MG/1
100 CAPSULE ORAL 3 TIMES DAILY PRN
Qty: 42 CAPSULE | Refills: 0 | Status: SHIPPED | OUTPATIENT
Start: 2021-11-17 | End: 2023-01-13

## 2022-01-17 ENCOUNTER — HOSPITAL ENCOUNTER (OUTPATIENT)
Dept: CT IMAGING | Facility: HOSPITAL | Age: 72
End: 2022-01-17

## 2022-05-02 ENCOUNTER — TELEPHONE (OUTPATIENT)
Dept: PULMONOLOGY | Facility: CLINIC | Age: 72
End: 2022-05-02

## 2022-05-02 NOTE — TELEPHONE ENCOUNTER
Patient called and left voicemail needing to schedule a follow up appointment due to having pneumonia in both lungs. Please call patient to schedule her an appointment, thank you.

## 2022-05-08 NOTE — PROGRESS NOTES
Primary Care Provider  Darryl Casarez MD     Referring Provider  No ref. provider found     Chief Complaint  Asthma and COPD    Subjective          History of Presenting Illness  Patient is a 71-year-old female, patient of Dr. Malcolm's who presents for management of mild intermittent asthma and COPD who presents for follow-up visit today.  Patient's daughter is present with the patient in the office today.  Patient states that since last office visit she was diagnosed with pneumonia back in April, 2022.  Patient states that she was treated with Levaquin and is feeling much better.  Patient states that she still has a cough however it has improved.  Patient states that she does get short of breath that is worse with exertion, moderate in severity, and improved with rest.  Patient was scheduled to have a chest CT scan, however did not have this completed and needs to have this test rescheduled.  Per chest x-ray report dated from 4/19/2022 that was brought into the office by the patient, report states extensive chronic changes in the lungs bilaterally more so on the left than the right.  However, these have improved since the prior study of 10/26/2021.  Possibility that prior infiltrates have resolved and recurred.  Patient states she is only taking Pulmicort and Lonhala nebulizer treatments every day as prescribed.  Patient states that she is not taking Brovana nebulizer treatments due to cost of medication.  Patient states that she has albuterol inhaler and albuterol nebulizer treatments to use as needed. Patient denies fever, chills, night sweats, swollen glands in the head and neck, unintentional weight loss, hemoptysis, purulent sputum production, dysphagia, chest pain, palpitations, chest tightness, abdominal pain, nausea, vomiting, and diarrhea.  Patient also denies any myalgias, changes in sense of taste and/or smell, sore throat, any other coronavirus or flu-like symptoms.  Patient denies any leg  swelling, orthopnea, paroxysmal nocturnal dyspnea.  Patient also denies any hematuria, hematochezia, hematemesis, and epistaxis.  Patient is able to perform activities of daily living.      Review of Systems   Constitutional: Negative for activity change, appetite change, chills, diaphoresis, fatigue, fever, unexpected weight gain and unexpected weight loss.        Negative for Insomnia   HENT: Negative for congestion (Nasal), mouth sores, nosebleeds, postnasal drip, sore throat, swollen glands and trouble swallowing.         Negative for Thrush  Negative for Hoarseness  Negative for Allergies/Hay Fever  Negative for Recent Head injury  Negative for Ear Fullness  Negative for Nasal or Sinus pain  Negative for Dry lips  Negative for Nasal discharge   Respiratory: Positive for cough and shortness of breath. Negative for apnea, chest tightness and wheezing.         Negative for Hemoptysis  Negative for Pleuritic pain   Cardiovascular: Negative for chest pain, palpitations and leg swelling.        Negative for Claudication  Negative for Cyanosis  Negative for Dyspnea on exertion   Gastrointestinal: Negative for abdominal pain, anal bleeding, blood in stool, diarrhea, nausea, vomiting and GERD.   Genitourinary: Negative for hematuria.   Musculoskeletal: Negative for joint swelling and myalgias.        Negative for Joint pain  Negative for Joint stiffness   Skin: Negative for color change, dry skin, pallor and rash.   Neurological: Negative for syncope, weakness and headache.   Hematological: Negative for adenopathy. Does not bruise/bleed easily.        History reviewed. No pertinent family history.     Social History     Socioeconomic History   • Marital status:    Tobacco Use   • Smoking status: Former Smoker     Packs/day: 0.50     Years: 5.00     Pack years: 2.50     Types: Cigarettes     Start date:      Quit date: 1970     Years since quittin.3   • Smokeless tobacco: Never Used   Vaping Use   •  Vaping Use: Never used   Substance and Sexual Activity   • Alcohol use: Never   • Drug use: Never        Past Medical History:   Diagnosis Date   • Asthma    • COPD (chronic obstructive pulmonary disease) (HCC)    • Coronary artery disease    • Diabetes mellitus (HCC)    • History of coronary angioplasty with insertion of stent         Immunization History   Administered Date(s) Administered   • FLUAD TRI 65YR+ 10/31/2017   • Fluzone High Dose =>65 Years ([employer] ONLY) 10/19/2016   • Fluzone High-Dose 65+yrs 11/19/2020   • Pneumococcal Conjugate 13-Valent (PCV13) 10/31/2017   • Pneumococcal Polysaccharide (PPSV23) 01/01/2017   • Shingrix 11/23/2020, 01/22/2021   • Zostavax 10/19/2016       Allergies   Allergen Reactions   • Codeine Unknown - Low Severity     swells   • Keflex [Cephalexin] Unknown - Low Severity     Causes nausea   • Nubain [Nalbuphine] Swelling   • Penicillins Unknown - Low Severity     Breaks her out          Current Outpatient Medications:   •  albuterol (PROVENTIL) (2.5 MG/3ML) 0.083% nebulizer solution, Take 2.5 mg by nebulization Every 4 (Four) Hours As Needed., Disp: , Rfl:   •  albuterol sulfate  (90 Base) MCG/ACT inhaler, Inhale 2 puffs Every 4 (Four) Hours As Needed for Wheezing or Shortness of Air for up to 30 days., Disp: 18 g, Rfl: 11  •  apixaban (ELIQUIS) 5 MG tablet tablet, Take 5 mg by mouth 2 (two) times a day., Disp: , Rfl:   •  atorvastatin (LIPITOR) 20 MG tablet, Take 20 mg by mouth every night at bedtime., Disp: , Rfl:   •  benzonatate (TESSALON) 100 MG capsule, Take 1 capsule by mouth 3 (Three) Times a Day As Needed for Cough., Disp: 42 capsule, Rfl: 0  •  brompheniramine-pseudoephedrine-DM 30-2-10 MG/5ML syrup, TAKE 10 ML BY MOUTH FOUR TIMES DAILY AS NEEDED FOR COUGH AND FOR CONGESTION, Disp: , Rfl:   •  budesonide (PULMICORT) 0.5 MG/2ML nebulizer solution, Take 2 mL by nebulization 2 (Two) Times a Day for 30 days. Rinse mouth out after each use, Disp: 120 mL, Rfl:  11  •  Diclofenac Sodium (VOLTAREN) 1 % gel gel, Apply  topically to the appropriate area as directed., Disp: , Rfl:   •  ezetimibe (ZETIA) 10 MG tablet, Take 10 mg by mouth Daily., Disp: , Rfl:   •  famotidine (PEPCID) 20 MG tablet, Take 20 mg by mouth 3 (Three) Times a Day., Disp: , Rfl:   •  fluticasone (VERAMYST) 27.5 MCG/SPRAY nasal spray, 2 sprays into the nostril(s) as directed by provider Daily., Disp: , Rfl:   •  gabapentin (NEURONTIN) 400 MG capsule, Take 400 mg by mouth 3 (Three) Times a Day., Disp: , Rfl:   •  Glycopyrrolate (Lonhala Magnair Refill Kit) 25 MCG/ML solution, Inhale 2 puffs 2 (two) times a day., Disp: , Rfl:   •  levothyroxine (SYNTHROID, LEVOTHROID) 88 MCG tablet, Take 88 mcg by mouth Daily., Disp: , Rfl:   •  loratadine (CLARITIN) 10 MG tablet, Take 10 mg by mouth Daily., Disp: , Rfl:   •  metoprolol tartrate (LOPRESSOR) 25 MG tablet, Take 25 mg by mouth 2 (Two) Times a Day., Disp: , Rfl:   •  Mirabegron ER (MYRBETRIQ) 50 MG tablet sustained-release 24 hour 24 hr tablet, Take 50 mg by mouth 2 (two) times a day., Disp: , Rfl:   •  montelukast (SINGULAIR) 10 MG tablet, Take 10 mg by mouth Every Night., Disp: , Rfl:   •  Semaglutide,0.25 or 0.5MG/DOS, (OZEMPIC) 2 MG/1.5ML solution pen-injector, Inject 1 mg under the skin into the appropriate area as directed 1 (One) Time Per Week. Monday, Disp: , Rfl:   •  formoterol (PERFOROMIST) 20 MCG/2ML nebulizer solution, Take 2 mL by nebulization 2 (Two) Times a Day for 30 days., Disp: 60 each, Rfl: 11     Objective     Physical Exam  Vital Signs:   WDWN, Alert, NAD.    HEENT:  PERRL, EOMI.  OP, nares clear, no sinus tenderness  Neck:  Supple, no JVD, no thyromegaly.  Lymph: no axillary, cervical, supraclavicular lymphadenopathy noted bilaterally  Chest:   Mildly decreased breath sounds throughout. No wheezes, rales, or rhonchi appreciated.  Normal work of breathing noted.  Patient is able speak full sentences without difficulty.  CV: RRR, no MGR,  "pulses 2+, equal.  Abd:  Soft, NT, ND, + BS, no HSM  EXT:  no clubbing, no cyanosis, no edema, no joint tenderness  Neuro:  A&Ox3, CN grossly intact, no focal deficits.  Skin: No rashes or lesions noted.    /95   Pulse 77   Resp 14   Ht 154.9 cm (61\")   Wt 81.6 kg (180 lb)   SpO2 97% Comment: room air -uses 2liters@night  BMI 34.01 kg/m²         Result Review :   I have personally reviewed my last office visit note.  I also reviewed chest x-ray report brought to the office by the patient.  Report has been scanned to patient's chart.  See scanned report.         Assessment and Plan      Assessment:  1.  COPD.  2.  COVID-19 pneumonia and unvaccinated patient back in September, 2021.  3.  Pneumonia in April, 2022 diagnosed and treated by PCP.  4.  Hypoxia.  5.  Mild intermittent asthma.  6.  Hypogammaglobulinemia.  7.  Seasonal allergies.  8.  Tobacco abuse of cigarettes in remission.      Plan:  1.    Patient reports that Brovana is too expensive.  Will start patient on Perforomist nebulizer treatments twice daily.  Patient is advised if nebulizer treatment is too expensive to notify our office.  Continue Pulmicort and Lonhala Magnair every day as prescribed and rinse mouth out after each use.  2.  Continue albuterol inhaler and albuterol nebulizer treatment as needed.   3.  Continue Claritin, Singulair, and Flonase.  4.  Will reorder chest CT scan.  Order placed today.  5.    Vaccination status: patient reports they are up-to-date with flu, pneumonia, and Covid vaccines.  Patient is advised to continue to follow CDC recommendations such as social distancing wearing a mask and washing hands for at least 20 seconds.  6.  Smoking status: Patient is a former cigarette smoker.  7.  Patient to call the office, 911, or go to the ER with new or worsening symptoms.  8.  Order for patient to have repeat chest x-ray given to the patient in the office today as patient prefers to have this completed [place], " [place].  9.  Follow-up  in 2 to 3 months with Dr. Malcolm, sooner if needed.          Follow Up   Return for 2-3 months with Dr. Malcolm.  Patient was given instructions and counseling regarding her condition or for health maintenance advice. Please see specific information pulled into the AVS if appropriate.

## 2022-05-12 ENCOUNTER — OFFICE VISIT (OUTPATIENT)
Dept: PULMONOLOGY | Facility: CLINIC | Age: 72
End: 2022-05-12

## 2022-05-12 VITALS
BODY MASS INDEX: 33.99 KG/M2 | SYSTOLIC BLOOD PRESSURE: 112 MMHG | HEART RATE: 77 BPM | RESPIRATION RATE: 14 BRPM | DIASTOLIC BLOOD PRESSURE: 95 MMHG | HEIGHT: 61 IN | WEIGHT: 180 LBS | OXYGEN SATURATION: 97 %

## 2022-05-12 DIAGNOSIS — J45.20 MILD INTERMITTENT ASTHMA, UNSPECIFIED WHETHER COMPLICATED: ICD-10-CM

## 2022-05-12 DIAGNOSIS — J44.9 CHRONIC OBSTRUCTIVE PULMONARY DISEASE, UNSPECIFIED COPD TYPE: Primary | ICD-10-CM

## 2022-05-12 DIAGNOSIS — D80.1 HYPOGAMMAGLOBULINEMIA: ICD-10-CM

## 2022-05-12 DIAGNOSIS — Z86.16 HISTORY OF COVID-19: ICD-10-CM

## 2022-05-12 DIAGNOSIS — J30.2 SEASONAL ALLERGIES: ICD-10-CM

## 2022-05-12 DIAGNOSIS — J18.9 PNEUMONIA DUE TO INFECTIOUS ORGANISM, UNSPECIFIED LATERALITY, UNSPECIFIED PART OF LUNG: ICD-10-CM

## 2022-05-12 DIAGNOSIS — J12.82 PNEUMONIA DUE TO COVID-19 VIRUS: ICD-10-CM

## 2022-05-12 DIAGNOSIS — U07.1 PNEUMONIA DUE TO COVID-19 VIRUS: ICD-10-CM

## 2022-05-12 DIAGNOSIS — F17.201 TOBACCO ABUSE, IN REMISSION: ICD-10-CM

## 2022-05-12 PROCEDURE — 99214 OFFICE O/P EST MOD 30 MIN: CPT | Performed by: NURSE PRACTITIONER

## 2022-05-12 RX ORDER — BROMPHENIRAMINE MALEATE, PSEUDOEPHEDRINE HYDROCHLORIDE, AND DEXTROMETHORPHAN HYDROBROMIDE 2; 30; 10 MG/5ML; MG/5ML; MG/5ML
SYRUP ORAL
COMMUNITY
Start: 2022-04-20 | End: 2023-01-13

## 2022-05-12 RX ORDER — FORMOTEROL FUMARATE 20 UG/2ML
20 SOLUTION RESPIRATORY (INHALATION)
Qty: 60 EACH | Refills: 11 | Status: SHIPPED | OUTPATIENT
Start: 2022-05-12 | End: 2023-01-13 | Stop reason: SDUPTHER

## 2022-05-12 RX ORDER — ALBUTEROL SULFATE 90 UG/1
2 AEROSOL, METERED RESPIRATORY (INHALATION) EVERY 4 HOURS PRN
Qty: 18 G | Refills: 11 | Status: SHIPPED | OUTPATIENT
Start: 2022-05-12 | End: 2022-06-11

## 2022-05-12 RX ORDER — BUDESONIDE AND FORMOTEROL FUMARATE DIHYDRATE 160; 4.5 UG/1; UG/1
2 AEROSOL RESPIRATORY (INHALATION) 2 TIMES DAILY
COMMUNITY
Start: 2022-04-21 | End: 2022-05-12

## 2022-05-12 RX ORDER — LEVOFLOXACIN 750 MG/1
750 TABLET ORAL DAILY
COMMUNITY
Start: 2022-04-20 | End: 2022-05-12

## 2022-05-12 RX ORDER — ATORVASTATIN CALCIUM 20 MG/1
20 TABLET, FILM COATED ORAL
COMMUNITY
Start: 2022-03-23

## 2022-05-12 RX ORDER — LEVOTHYROXINE SODIUM 88 UG/1
88 TABLET ORAL DAILY
COMMUNITY
Start: 2022-02-21

## 2022-05-12 RX ORDER — ALBUTEROL SULFATE 2.5 MG/3ML
2.5 SOLUTION RESPIRATORY (INHALATION) EVERY 4 HOURS PRN
COMMUNITY
End: 2022-12-15 | Stop reason: SDUPTHER

## 2022-05-12 RX ORDER — BUDESONIDE 0.5 MG/2ML
0.5 INHALANT ORAL 2 TIMES DAILY
Qty: 120 ML | Refills: 11 | Status: SHIPPED | OUTPATIENT
Start: 2022-05-12 | End: 2022-06-11

## 2022-05-12 RX ORDER — FAMOTIDINE 20 MG/1
20 TABLET, FILM COATED ORAL 3 TIMES DAILY
COMMUNITY
Start: 2022-04-13

## 2022-06-01 DIAGNOSIS — R06.00 DYSPNEA, UNSPECIFIED TYPE: Primary | ICD-10-CM

## 2022-11-15 ENCOUNTER — OFFICE VISIT (OUTPATIENT)
Dept: PULMONOLOGY | Facility: CLINIC | Age: 72
End: 2022-11-15

## 2022-11-15 VITALS
RESPIRATION RATE: 18 BRPM | WEIGHT: 190 LBS | BODY MASS INDEX: 35.87 KG/M2 | HEIGHT: 61 IN | SYSTOLIC BLOOD PRESSURE: 133 MMHG | HEART RATE: 72 BPM | DIASTOLIC BLOOD PRESSURE: 87 MMHG | OXYGEN SATURATION: 95 % | TEMPERATURE: 96 F

## 2022-11-15 DIAGNOSIS — J44.9 CHRONIC OBSTRUCTIVE PULMONARY DISEASE, UNSPECIFIED COPD TYPE: ICD-10-CM

## 2022-11-15 DIAGNOSIS — J18.9 PNEUMONIA DUE TO INFECTIOUS ORGANISM, UNSPECIFIED LATERALITY, UNSPECIFIED PART OF LUNG: Primary | ICD-10-CM

## 2022-11-15 PROCEDURE — 99214 OFFICE O/P EST MOD 30 MIN: CPT | Performed by: INTERNAL MEDICINE

## 2022-11-15 RX ORDER — LEVOFLOXACIN 500 MG/1
500 TABLET, FILM COATED ORAL DAILY
Qty: 7 TABLET | Refills: 0 | Status: SHIPPED | OUTPATIENT
Start: 2022-11-15 | End: 2022-11-22

## 2022-11-15 RX ORDER — DEXTROMETHORPHAN HYDROBROMIDE AND PROMETHAZINE HYDROCHLORIDE 15; 6.25 MG/5ML; MG/5ML
SYRUP ORAL
COMMUNITY
Start: 2022-11-07 | End: 2023-01-13

## 2022-11-15 RX ORDER — ONDANSETRON 8 MG/1
8 TABLET, ORALLY DISINTEGRATING ORAL 4 TIMES DAILY PRN
COMMUNITY
Start: 2022-10-26

## 2022-11-15 RX ORDER — FORMOTEROL FUMARATE 20 UG/2ML
20 SOLUTION RESPIRATORY (INHALATION)
Qty: 60 EACH | Refills: 11 | Status: SHIPPED | OUTPATIENT
Start: 2022-11-15

## 2022-11-15 RX ORDER — AZELASTINE 1 MG/ML
1 SPRAY, METERED NASAL 2 TIMES DAILY
COMMUNITY
Start: 2022-10-17

## 2022-11-15 NOTE — ASSESSMENT & PLAN NOTE
Patient clinically with pneumonia with increased cough increased sputum production and crackles on exam today    At this time we will treat patient with a course of Levaquin 500 for 7 days that she is recently been on antibiotics unsure the antibiotic from the best I can tell she was taking a Zithromax possibly    Given the fact that she has persistent symptoms and failed previous treatment we will treat with Levaquin as to try to avoid hospitalization    We will see patient back in 4 weeks if she still having significant crackles will need CT scan of the chest

## 2022-11-15 NOTE — ASSESSMENT & PLAN NOTE
COPD is currently exacerbated due to pneumonia  Treat with antibiotics  Given the patient's diabetes we will hold off on steroids at this time as she is no longer wheezing  Continue Entocort steroid, continue LABA, continue LAMA

## 2022-11-15 NOTE — PROGRESS NOTES
"Chief Complaint  COPD, Pneumonia due to infectious organism , Asthma, Pneumonia due to COVID-19 virus , and Follow-up (3 Month )    Subjective        Lora Pastor presents to [facility]  History of Present Illness  Patient with COPD here for follow-up  Been having increased cough  Recently diagnosed with pneumonia  Completed course of antibiotics but still have persistent symptoms  Shortness of breath noted  No hemoptysis    Objective   Vital Signs:  /87 (BP Location: Right arm, Patient Position: Sitting, Cuff Size: Adult)   Pulse 72   Temp 96 °F (35.6 °C) (Temporal)   Resp 18   Ht 154.9 cm (61\")   Wt 86.2 kg (190 lb)   SpO2 95% Comment: roomm air  BMI 35.90 kg/m²   Estimated body mass index is 35.9 kg/m² as calculated from the following:    Height as of this encounter: 154.9 cm (61\").    Weight as of this encounter: 86.2 kg (190 lb).          Physical Exam   vital Signs Reviewed  General WDWN, Alert, NAD.    Chest: Scattered crackles heard more prominent on the right compared to left  CV: RRR, no MGR, .  EXT:  no clubbing, no cyanosis, no edema, no joint tenderness  Neuro:  A&Ox3, CN grossly intact, no focal deficits.  Skin: No rashes or lesions noted  Result Review :                Assessment and Plan   Diagnoses and all orders for this visit:    1. Pneumonia due to infectious organism, unspecified laterality, unspecified part of lung (Primary)  Assessment & Plan:  Patient clinically with pneumonia with increased cough increased sputum production and crackles on exam today    At this time we will treat patient with a course of Levaquin 500 for 7 days that she is recently been on antibiotics unsure the antibiotic from the best I can tell she was taking a Zithromax possibly    Given the fact that she has persistent symptoms and failed previous treatment we will treat with Levaquin as to try to avoid hospitalization    We will see patient back in " 4 weeks if she still having significant crackles will need CT scan of the chest      2. Chronic obstructive pulmonary disease, unspecified COPD type ([facility])  Assessment & Plan:  COPD is currently exacerbated due to pneumonia  Treat with antibiotics  Given the patient's diabetes we will hold off on steroids at this time as she is no longer wheezing  Continue Entocort steroid, continue LABA, continue LAMA        Other orders  -     formoterol (PERFOROMIST) 20 MCG/2ML nebulizer solution; Take 2 mL by nebulization 2 (Two) Times a Day.  Dispense: 60 each; Refill: 11  -     levoFLOXacin (Levaquin) 500 MG tablet; Take 1 tablet by mouth Daily for 7 days.  Dispense: 7 tablet; Refill: 0           Follow Up   Return in about 4 weeks (around 12/13/2022).  Patient was given instructions and counseling regarding her condition or for health maintenance advice. Please see specific information pulled into the AVS if appropriate.

## 2022-12-12 NOTE — PROGRESS NOTES
Primary Care Provider  Darryl Casarez MD     Referring Provider  No ref. provider found     Chief Complaint  Shortness of Breath, Cough (Coughing up green mucus ), and Follow-up (1 mo f/up )    Subjective          Lora Pastor presents to [facility]  History of Present Illness  Lroa Pastor is a 72 y.o. female patient of Dr. Malcolm here for management of COPD, mild intermittent asthma, seasonal allergies, cough, wheezing, dyspnea on exertion and tobacco abuse of cigarettes in remission.    Patient states she is doing okay since her last visit.  She did complete the course of antibiotics that was previously prescribed by Dr. Malcolm.  She still complains of a productive cough with green mucus.  She denies any fevers but states that she does have chills at times.  She continues to use Perforomist, Pulmicort and Lonhala nebulizer treatments as prescribed.  She has been using her albuterol nebulizer treatments twice daily.  She continues to take Claritin, Singulair and Flonase for seasonal allergies.  Patient was recently treated by Dr. Malcolm for pneumonia and was given Levaquin for 7 days.  Patient does state that she feels better since completing this treatment.  It is noted in his last office note, that if she is still having significant crackles during this office visit, we would order a chest CT. patient does complain of feeling fatigued and overall not feeling well.  She is borderline diabetic and we are holding off on steroids at this time.  Otherwise, she has no additional concerns at this time.  She is able to perform her ADLs with minor modifications.  She is up-to-date with her COVID, flu and pneumonia vaccines.     Her history of smoking is   Tobacco Use: Medium Risk   • Smoking Tobacco Use: Former   • Smokeless Tobacco Use: Never   • Passive Exposure: Not on file   .    Review of Systems   Constitutional: Positive for chills. Negative  for fatigue, fever, unexpected weight gain and unexpected weight loss.   HENT: Negative for congestion (Nasal), hearing loss, mouth sores, nosebleeds, postnasal drip, sore throat and trouble swallowing.    Eyes: Negative for blurred vision and visual disturbance.   Respiratory: Positive for cough, shortness of breath and wheezing. Negative for apnea.         Negative for Hemoptysis     Cardiovascular: Negative for chest pain, palpitations and leg swelling.   Gastrointestinal: Negative for abdominal pain, constipation, diarrhea, nausea, vomiting and GERD.        Negative for Jaundice  Negative for Bloating  Negative for Melena   Musculoskeletal: Negative for joint swelling and myalgias.        Negative for Joint pain  Negative for Joint stiffness   Skin: Negative for color change.        Negative for cyanosis   Neurological: Negative for syncope, weakness, numbness and headache.      Sleep: Negative for Excessive daytime sleepiness  Negative for morning headaches  Negative for Snoring    History reviewed. No pertinent family history.     Social History     Socioeconomic History   • Marital status:    Tobacco Use   • Smoking status: Former     Packs/day: 0.50     Years: 5.00     Pack years: 2.50     Types: Cigarettes     Start date:      Quit date: 1970     Years since quittin.9   • Smokeless tobacco: Never   Vaping Use   • Vaping Use: Never used   Substance and Sexual Activity   • Alcohol use: Never   • Drug use: Never        Past Medical History:   Diagnosis Date   • Asthma    • COPD (chronic obstructive pulmonary disease) (HCC)    • Coronary artery disease    • Diabetes mellitus (HCC)    • History of coronary angioplasty with insertion of stent         Immunization History   Administered Date(s) Administered   • COVID-19 (MODERNA) 1st, 2nd, 3rd Dose Only 2022, 2022   • FLUAD TRI 65YR+ 10/31/2017   • Fluad Quad 65+ 2021, 2022   • Fluzone High Dose =>65 Years ([employer] ONLY)  10/19/2016   • Fluzone High-Dose 65+yrs 11/19/2020   • Pneumococcal Conjugate 13-Valent (PCV13) 10/31/2017   • Pneumococcal Polysaccharide (PPSV23) 01/01/2017   • Shingrix 11/23/2020, 01/22/2021   • Zostavax 10/19/2016         Allergies   Allergen Reactions   • Codeine Unknown - Low Severity     swells   • Keflex [Cephalexin] Unknown - Low Severity     Causes nausea   • Nubain [Nalbuphine] Swelling   • Penicillins Unknown - Low Severity     Breaks her out          Current Outpatient Medications:   •  albuterol (PROVENTIL) (2.5 MG/3ML) 0.083% nebulizer solution, Take 2.5 mg by nebulization Every 4 (Four) Hours As Needed., Disp: , Rfl:   •  allopurinol (ZYLOPRIM) 300 MG tablet, Take 300 mg by mouth 2 (Two) Times a Day., Disp: , Rfl:   •  apixaban (ELIQUIS) 5 MG tablet tablet, Take 5 mg by mouth 2 (two) times a day., Disp: , Rfl:   •  atorvastatin (LIPITOR) 20 MG tablet, Take 20 mg by mouth every night at bedtime., Disp: , Rfl:   •  azelastine (ASTELIN) 0.1 % nasal spray, 1 spray 2 (Two) Times a Day., Disp: , Rfl:   •  Diclofenac Sodium (VOLTAREN) 1 % gel gel, Apply  topically to the appropriate area as directed., Disp: , Rfl:   •  ezetimibe (ZETIA) 10 MG tablet, Take 10 mg by mouth Daily., Disp: , Rfl:   •  famotidine (PEPCID) 20 MG tablet, Take 20 mg by mouth 3 (Three) Times a Day., Disp: , Rfl:   •  fluticasone (VERAMYST) 27.5 MCG/SPRAY nasal spray, 2 sprays into the nostril(s) as directed by provider Daily., Disp: , Rfl:   •  formoterol (PERFOROMIST) 20 MCG/2ML nebulizer solution, Take 2 mL by nebulization 2 (Two) Times a Day., Disp: 60 each, Rfl: 11  •  gabapentin (NEURONTIN) 400 MG capsule, Take 400 mg by mouth 3 (Three) Times a Day., Disp: , Rfl:   •  Glycopyrrolate (Lonhala Magnair Refill Kit) 25 MCG/ML solution, Inhale 2 puffs 2 (two) times a day., Disp: , Rfl:   •  levothyroxine (SYNTHROID, LEVOTHROID) 88 MCG tablet, Take 88 mcg by mouth Daily., Disp: , Rfl:   •  loratadine (CLARITIN) 10 MG tablet, Take 10 mg  by mouth Daily., Disp: , Rfl:   •  metoprolol tartrate (LOPRESSOR) 25 MG tablet, Take 25 mg by mouth 2 (Two) Times a Day., Disp: , Rfl:   •  Mirabegron ER (MYRBETRIQ) 50 MG tablet sustained-release 24 hour 24 hr tablet, Take 50 mg by mouth 2 (two) times a day., Disp: , Rfl:   •  montelukast (SINGULAIR) 10 MG tablet, Take 10 mg by mouth Every Night., Disp: , Rfl:   •  ondansetron ODT (ZOFRAN-ODT) 8 MG disintegrating tablet, 8 mg 4 (Four) Times a Day As Needed., Disp: , Rfl:   •  promethazine-dextromethorphan (PROMETHAZINE-DM) 6.25-15 MG/5ML syrup, TAKE 10 ML BY MOUTH EVERY 4 TO 6 HOURS AS NEEDED FOR COUGH., Disp: , Rfl:   •  Semaglutide,0.25 or 0.5MG/DOS, (OZEMPIC) 2 MG/1.5ML solution pen-injector, Inject 1 mg under the skin into the appropriate area as directed 1 (One) Time Per Week. Monday, Disp: , Rfl:   •  benzonatate (TESSALON) 100 MG capsule, Take 1 capsule by mouth 3 (Three) Times a Day As Needed for Cough., Disp: 42 capsule, Rfl: 0  •  brompheniramine-pseudoephedrine-DM 30-2-10 MG/5ML syrup, TAKE 10 ML BY MOUTH FOUR TIMES DAILY AS NEEDED FOR COUGH AND FOR CONGESTION, Disp: , Rfl:   •  budesonide (PULMICORT) 0.5 MG/2ML nebulizer solution, Take 2 mL by nebulization 2 (Two) Times a Day for 30 days. Rinse mouth out after each use, Disp: 120 mL, Rfl: 11  •  doxycycline (VIBRAMYCIN) 100 MG capsule, Take 1 capsule by mouth 2 (Two) Times a Day., Disp: 20 capsule, Rfl: 0  •  formoterol (PERFOROMIST) 20 MCG/2ML nebulizer solution, Take 2 mL by nebulization 2 (Two) Times a Day for 30 days., Disp: 60 each, Rfl: 11  •  ondansetron ODT (ZOFRAN-ODT) 4 MG disintegrating tablet, Place 1 tablet on the tongue Every 8 (Eight) Hours As Needed for Nausea or Vomiting., Disp: 15 tablet, Rfl: 0  •  predniSONE (DELTASONE) 20 MG tablet, Take 20 mg by mouth 2 (Two) Times a Day., Disp: , Rfl:      Objective   Physical Exam  Constitutional:       General: She is not in acute distress.     Appearance: Normal appearance. She is normal  "weight.   HENT:      Right Ear: Hearing normal.      Left Ear: Hearing normal.      Nose: No nasal tenderness or congestion.      Mouth/Throat:      Mouth: Mucous membranes are moist. No oral lesions.   Eyes:      Extraocular Movements: Extraocular movements intact.      Pupils: Pupils are equal, round, and reactive to light.   Neck:      Thyroid: No thyroid mass or thyromegaly.   Cardiovascular:      Rate and Rhythm: Normal rate and regular rhythm.      Pulses: Normal pulses.      Heart sounds: Normal heart sounds. No murmur heard.  Pulmonary:      Effort: Pulmonary effort is normal.      Breath sounds: Examination of the right-lower field reveals rhonchi. Examination of the left-lower field reveals rhonchi. Decreased breath sounds and rhonchi present. No wheezing or rales.   Abdominal:      General: Bowel sounds are normal. There is no distension.      Palpations: Abdomen is soft.      Tenderness: There is no abdominal tenderness.   Musculoskeletal:      Cervical back: Neck supple.      Right lower leg: No edema.      Left lower leg: No edema.   Lymphadenopathy:      Cervical: No cervical adenopathy.      Upper Body:      Right upper body: No axillary adenopathy.   Skin:     General: Skin is warm and dry.      Findings: No lesion or rash.   Neurological:      General: No focal deficit present.      Mental Status: She is alert and oriented to person, place, and time.      Cranial Nerves: Cranial nerves are intact.   Psychiatric:         Mood and Affect: Affect normal. Mood is not anxious or depressed.         Vital Signs:   /62 (BP Location: Right arm, Patient Position: Sitting, Cuff Size: Large Adult)   Pulse 101   Temp 98.2 °F (36.8 °C) (Temporal)   Resp 24   Ht 154.9 cm (61\")   Wt 84.4 kg (186 lb)   SpO2 96% Comment: room air; 2 L's noctural  BMI 35.14 kg/m²        Result Review :       Personally reviewed CBC and CMP from 9/28/2021    Data reviewed: Radiologic studies Chest x-ray 9/26/2021 and " Yun's last office note   Procedures        Assessment and Plan {CC Problem List  Visit Diagnosis   ROS  Review (Popup)  Health Maintenance  Quality  BestPractice  Medications  SmartSets  SnapShot Encounters  Media :23}   Diagnoses and all orders for this visit:    1. Chronic obstructive pulmonary disease, unspecified COPD type (HCC) (Primary)  -     Respiratory Culture - Sputum, Cough; Future    2. Mild intermittent asthma, unspecified whether complicated    3. Seasonal allergies    4. Dyspnea on exertion  -     CT Chest Without Contrast; Future  -     doxycycline (VIBRAMYCIN) 100 MG capsule; Take 1 capsule by mouth 2 (Two) Times a Day.  Dispense: 20 capsule; Refill: 0    5. Nicotine dependence, cigarettes, in remission    6. Allergic rhinitis, unspecified seasonality, unspecified trigger    7. Cough, unspecified type  -     CT Chest Without Contrast; Future  -     doxycycline (VIBRAMYCIN) 100 MG capsule; Take 1 capsule by mouth 2 (Two) Times a Day.  Dispense: 20 capsule; Refill: 0  -     Respiratory Culture - Sputum, Cough; Future    8. Wheezing  -     CT Chest Without Contrast; Future  -     doxycycline (VIBRAMYCIN) 100 MG capsule; Take 1 capsule by mouth 2 (Two) Times a Day.  Dispense: 20 capsule; Refill: 0    9. Nausea  -     ondansetron ODT (ZOFRAN-ODT) 4 MG disintegrating tablet; Place 1 tablet on the tongue Every 8 (Eight) Hours As Needed for Nausea or Vomiting.  Dispense: 15 tablet; Refill: 0    10. Severe obesity (BMI 35.0-35.9 with comorbidity) ([facility])    11.  Continue Perforomist, Pulmicort and Lonhala as prescribed.  Rinse mouth out after each use.  12.  Continue albuterol and nebulizer treatments as needed.  13.  Continue Singulair, Claritin, Flonase and azelastine nasal spray for seasonal allergies and allergic rhinitis.  14.  CT scan of the chest ordered to evaluate patient's shortness of breath and persistent pneumonia  15.  Encouraged patient try stay as active as possible.  Recommend  30 minutes of exercise.  16.  Follow-up with 1 month, sooner if needed.        Follow Up   Return in about 1 month (around 1/13/2023) for Recheck after CT.  Patient was given instructions and counseling regarding her condition or for health maintenance advice. Please see specific information pulled into the AVS if appropriate.

## 2022-12-12 NOTE — PATIENT INSTRUCTIONS
COPD and Physical Activity  Chronic obstructive pulmonary disease (COPD) is a long-term, or chronic, condition that affects the lungs. COPD is a general term that can be used to describe many problems that cause inflammation of the lungs and limit airflow. These conditions include chronic bronchitis and emphysema.  The main symptom of COPD is shortness of breath, which makes it harder to do even simple tasks. This can also make it harder to exercise and stay active. Talk with your health care provider about treatments to help you breathe better and actions you can take to prevent breathing problems during physical activity.  What are the benefits of exercising when you have COPD?  Exercising regularly is an important part of a healthy lifestyle. You can still exercise and do physical activities even though you have COPD. Exercise and physical activity improve your shortness of breath by increasing blood flow (circulation). This causes your heart to pump more oxygen through your body. Moderate exercise can:  Improve oxygen use.  Increase your energy level.  Help with shortness of breath.  Strengthen your breathing muscles.  Improve heart health.  Help with sleep.  Improve your self-esteem and feelings of self-worth.  Lower depression, stress, and anxiety.  Exercise can benefit everyone with COPD. The severity of your disease may affect how hard you can exercise, especially at first, but everyone can benefit. Talk with your health care provider about how much exercise is safe for you, and which activities and exercises are safe for you.  What actions can I take to prevent breathing problems during physical activity?  Sign up for a pulmonary rehabilitation program. This type of program may include:  Education about lung diseases.  Exercise classes that teach you how to exercise and be more active while improving your breathing. This usually involves:  Exercise using your lower extremities, such as a stationary  bicycle.  About 30 minutes of exercise, 2 to 5 times per week, for 6 to 12 weeks.  Strength training, such as push-ups or leg lifts.  Nutrition education.  Group classes in which you can talk with others who also have COPD and learn ways to manage stress.  If you use an oxygen tank, you should use it while you exercise. Work with your health care provider to adjust your oxygen for your physical activity. Your resting flow rate is different from your flow rate during physical activity.  How to manage your breathing while exercising  While you are exercising:  Take slow breaths.  Pace yourself, and do nottry to go too fast.  Purse your lips while breathing out. Pursing your lips is similar to a kissing or whistling position.  If doing exercise that uses a quick burst of effort, such as weight lifting:  Breathe in before starting the exercise.  Breathe out during the hardest part of the exercise, such as raising the weights.  Where to find support  You can find support for exercising with COPD from:  Your health care provider.  A pulmonary rehabilitation program.  Your local health department or community health programs.  Support groups, either online or in-person. Your health care provider may be able to recommend support groups.  Where to find more information  You can find more information about exercising with COPD from:  American Lung Association: lung.org  COPD Foundation: copdfoundation.org  Contact a health care provider if:  Your symptoms get worse.  You have nausea.  You have a fever.  You want to start a new exercise program or a new activity.  Get help right away if:  You have chest pain.  You cannot breathe.  These symptoms may represent a serious problem that is an emergency. Do not wait to see if the symptoms will go away. Get medical help right away. Call your local emergency services (911 in the [place] Do not drive yourself to the hospital.  Summary  COPD is a general term that can be used to describe  many different lung problems that cause lung inflammation and limit airflow. This includes chronic bronchitis and emphysema.  Exercise and physical activity improve your shortness of breath by increasing blood flow (circulation). This causes your heart to provide more oxygen to your body.  Contact your health care provider before starting any exercise program or new activity. Ask your health care provider what exercises and activities are safe for you.  This information is not intended to replace advice given to you by your health care provider. Make sure you discuss any questions you have with your health care provider.  Document Revised: 10/26/2021 Document Reviewed: 10/26/2021  [employer] Patient Education © 2022 [employer]

## 2022-12-13 ENCOUNTER — OFFICE VISIT (OUTPATIENT)
Dept: PULMONOLOGY | Facility: CLINIC | Age: 72
End: 2022-12-13

## 2022-12-13 VITALS
WEIGHT: 186 LBS | BODY MASS INDEX: 35.12 KG/M2 | RESPIRATION RATE: 24 BRPM | SYSTOLIC BLOOD PRESSURE: 106 MMHG | HEART RATE: 101 BPM | DIASTOLIC BLOOD PRESSURE: 62 MMHG | HEIGHT: 61 IN | TEMPERATURE: 98.2 F | OXYGEN SATURATION: 96 %

## 2022-12-13 DIAGNOSIS — J30.2 SEASONAL ALLERGIES: ICD-10-CM

## 2022-12-13 DIAGNOSIS — J45.20 MILD INTERMITTENT ASTHMA, UNSPECIFIED WHETHER COMPLICATED: ICD-10-CM

## 2022-12-13 DIAGNOSIS — J30.9 ALLERGIC RHINITIS, UNSPECIFIED SEASONALITY, UNSPECIFIED TRIGGER: ICD-10-CM

## 2022-12-13 DIAGNOSIS — J44.9 CHRONIC OBSTRUCTIVE PULMONARY DISEASE, UNSPECIFIED COPD TYPE: Primary | ICD-10-CM

## 2022-12-13 DIAGNOSIS — R05.9 COUGH, UNSPECIFIED TYPE: ICD-10-CM

## 2022-12-13 DIAGNOSIS — E66.01 SEVERE OBESITY (BMI 35.0-35.9 WITH COMORBIDITY): ICD-10-CM

## 2022-12-13 DIAGNOSIS — R11.0 NAUSEA: ICD-10-CM

## 2022-12-13 DIAGNOSIS — R06.2 WHEEZING: ICD-10-CM

## 2022-12-13 DIAGNOSIS — F17.211 NICOTINE DEPENDENCE, CIGARETTES, IN REMISSION: ICD-10-CM

## 2022-12-13 DIAGNOSIS — R06.09 DYSPNEA ON EXERTION: ICD-10-CM

## 2022-12-13 PROCEDURE — 99214 OFFICE O/P EST MOD 30 MIN: CPT | Performed by: NURSE PRACTITIONER

## 2022-12-13 RX ORDER — DOXYCYCLINE HYCLATE 100 MG/1
100 CAPSULE ORAL 2 TIMES DAILY
Qty: 20 CAPSULE | Refills: 0 | Status: SHIPPED | OUTPATIENT
Start: 2022-12-13 | End: 2023-01-13

## 2022-12-13 RX ORDER — ALLOPURINOL 300 MG/1
300 TABLET ORAL 2 TIMES DAILY
COMMUNITY
Start: 2022-09-29

## 2022-12-13 RX ORDER — PREDNISONE 20 MG/1
20 TABLET ORAL 2 TIMES DAILY
COMMUNITY
Start: 2022-11-12 | End: 2023-01-13

## 2022-12-13 RX ORDER — ONDANSETRON 4 MG/1
4 TABLET, ORALLY DISINTEGRATING ORAL EVERY 8 HOURS PRN
Qty: 15 TABLET | Refills: 0 | Status: SHIPPED | OUTPATIENT
Start: 2022-12-13

## 2022-12-15 RX ORDER — ALBUTEROL SULFATE 2.5 MG/3ML
2.5 SOLUTION RESPIRATORY (INHALATION) EVERY 4 HOURS PRN
Qty: 180 ML | Refills: 3 | Status: SHIPPED | OUTPATIENT
Start: 2022-12-15

## 2023-01-09 ENCOUNTER — HOSPITAL ENCOUNTER (OUTPATIENT)
Dept: CT IMAGING | Facility: HOSPITAL | Age: 73
Discharge: HOME | End: 2023-01-09
Admitting: NURSE PRACTITIONER
Payer: MEDICARE

## 2023-01-09 DIAGNOSIS — R06.09 DYSPNEA ON EXERTION: ICD-10-CM

## 2023-01-09 DIAGNOSIS — R05.9 COUGH, UNSPECIFIED TYPE: ICD-10-CM

## 2023-01-09 DIAGNOSIS — R06.2 WHEEZING: ICD-10-CM

## 2023-01-09 PROCEDURE — 71250 CT THORAX DX C-: CPT

## 2023-01-12 NOTE — PROGRESS NOTES
Primary Care Provider  Darryl Casarez MD     Referring Provider  No ref. provider found     Chief Complaint  COPD, Cough, and Follow-up (1 month f/up - post Chest CT )    Subjective          Lora Pastor presents to [facility] PULMONARY & CRITICAL CARE MEDICINE  History of Present Illness  Lora Pastor is a 72 y.o. female patient of Dr. Malcolm here for management of COPD, mild intermittent asthma, seasonal allergies, dyspnea on exertion and tobacco abuse of cigarettes in remission.    Patient dates she is doing okay since her last visit.  She denies using any additional antibiotics and steroids other than what was prescribed at her visit.  She denies any current fevers or chills.  Her shortness of breath is moderate in severity, worse with exertion and improved with rest.  Still complains of a productive cough at times with some green sputum.  The majority of her cough is dry.  She does have a different cough syrups at home, but states one of them makes her drowsy and she is unable to drive with it.  She also notes the expense of the cough syrups but is curious if anything else will help her.  Patient has tried Tessalon Perles in the past with little improvement in her symptoms.  She continues to use Perforomist, Pulmicort and Lonhala as prescribed.  Patient states that her primary care provider also gave her a Breztri inhaler to use.  I have explained to patient that she cannot use this inhaler along with her nebulizer treatments.  Patient asked if she would be able to use the inhaler on days that she is unable to use her nebulizers or is away from home.  I explained this will be okay as long as she is not doubling up on the medication.  She has been using her albuterol nebulizer treatments twice a day.  She continues to take Claritin, Singulair and Flonase for seasonal allergies.  She recently had a chest CT on 1/9/2023.  This does show mixed pattern of groundglass opacity with  linear and bandlike scarring and intralobular septal thickening.  After speaking with Dr. Malcolm, he believes that these are chronic changes.  There is also a small hiatal hernia and some coronary artery calcification.  These findings were discussed with patient and daughter today.  Otherwise, she has no additional concerns at this time.  She is able to perform her ADLs with minor modifications.  She is up-to-date with her COVID, flu and pneumonia vaccines.     Her history of smoking is   Tobacco Use: Medium Risk   • Smoking Tobacco Use: Former   • Smokeless Tobacco Use: Never   • Passive Exposure: Not on file   .    Review of Systems   Constitutional: Negative for chills, fatigue, fever, unexpected weight gain and unexpected weight loss.   HENT: Negative for congestion (Nasal), hearing loss, mouth sores, nosebleeds, postnasal drip, sore throat and trouble swallowing.    Eyes: Negative for blurred vision and visual disturbance.   Respiratory: Positive for cough and shortness of breath. Negative for apnea and wheezing.         Negative for Hemoptysis     Cardiovascular: Negative for chest pain, palpitations and leg swelling.   Gastrointestinal: Negative for abdominal pain, constipation, diarrhea, nausea, vomiting and GERD.        Negative for Jaundice  Negative for Bloating  Negative for Melena   Musculoskeletal: Negative for joint swelling and myalgias.        Negative for Joint pain  Negative for Joint stiffness   Skin: Negative for color change.        Negative for cyanosis   Neurological: Negative for syncope, weakness, numbness and headache.      Sleep: Negative for Excessive daytime sleepiness  Negative for morning headaches  Negative for Snoring    History reviewed. No pertinent family history.     Social History     Socioeconomic History   • Marital status:    Tobacco Use   • Smoking status: Former     Packs/day: 0.50     Years: 5.00     Pack years: 2.50     Types: Cigarettes     Start date: 1965      Quit date: 1970     Years since quittin.0   • Smokeless tobacco: Never   Vaping Use   • Vaping Use: Never used   Substance and Sexual Activity   • Alcohol use: Never   • Drug use: Never        Past Medical History:   Diagnosis Date   • Asthma    • COPD (chronic obstructive pulmonary disease) (HCC)    • Coronary artery disease    • Diabetes mellitus (HCC)    • History of coronary angioplasty with insertion of stent         Immunization History   Administered Date(s) Administered   • COVID-19 (MODERNA) 1st, 2nd, 3rd Dose Only 2022, 2022   • FLUAD TRI 65YR+ 10/31/2017   • Fluad Quad 65+ 2021, 2022   • Fluzone High Dose =>65 Years (Vaxcare ONLY) 10/19/2016, 10/30/2022   • Fluzone High-Dose 65+yrs 2020   • Pneumococcal Conjugate 13-Valent (PCV13) 10/31/2017   • Pneumococcal Polysaccharide (PPSV23) 2017   • Shingrix 2020, 2021   • Zostavax 10/19/2016         Allergies   Allergen Reactions   • Lisinopril Unknown - Low Severity   • Codeine Unknown - Low Severity     swells   • Keflex [Cephalexin] Unknown - Low Severity     Causes nausea   • Nubain [Nalbuphine] Swelling   • Penicillins Unknown - Low Severity     Breaks her out          Current Outpatient Medications:   •  albuterol (PROVENTIL) (2.5 MG/3ML) 0.083% nebulizer solution, Take 2.5 mg by nebulization Every 4 (Four) Hours As Needed for Wheezing or Shortness of Air., Disp: 180 mL, Rfl: 3  •  allopurinol (ZYLOPRIM) 300 MG tablet, Take 300 mg by mouth 2 (Two) Times a Day., Disp: , Rfl:   •  apixaban (ELIQUIS) 5 MG tablet tablet, Take 5 mg by mouth 2 (two) times a day., Disp: , Rfl:   •  atorvastatin (LIPITOR) 20 MG tablet, Take 20 mg by mouth every night at bedtime., Disp: , Rfl:   •  azelastine (ASTELIN) 0.1 % nasal spray, 1 spray 2 (Two) Times a Day., Disp: , Rfl:   •  Budeson-Glycopyrrol-Formoterol (Breztri Aerosphere) 160-9-4.8 MCG/ACT aerosol inhaler, Inhale 2 puffs 2 (Two) Times a Day., Disp: , Rfl:   •   Diclofenac Sodium (VOLTAREN) 1 % gel gel, Apply  topically to the appropriate area as directed., Disp: , Rfl:   •  ezetimibe (ZETIA) 10 MG tablet, Take 10 mg by mouth Daily., Disp: , Rfl:   •  famotidine (PEPCID) 20 MG tablet, Take 20 mg by mouth 3 (Three) Times a Day., Disp: , Rfl:   •  fluticasone (VERAMYST) 27.5 MCG/SPRAY nasal spray, 2 sprays into the nostril(s) as directed by provider Daily., Disp: , Rfl:   •  formoterol (PERFOROMIST) 20 MCG/2ML nebulizer solution, Take 2 mL by nebulization 2 (Two) Times a Day., Disp: 60 each, Rfl: 11  •  gabapentin (NEURONTIN) 400 MG capsule, Take 400 mg by mouth 3 (Three) Times a Day., Disp: , Rfl:   •  Glycopyrrolate (Lonhala Magnair Refill Kit) 25 MCG/ML solution, Inhale 2 puffs 2 (two) times a day., Disp: , Rfl:   •  levothyroxine (SYNTHROID, LEVOTHROID) 75 MCG tablet, Take 75 mcg by mouth Daily., Disp: , Rfl:   •  levothyroxine (SYNTHROID, LEVOTHROID) 88 MCG tablet, Take 88 mcg by mouth Daily., Disp: , Rfl:   •  loratadine (CLARITIN) 10 MG tablet, Take 10 mg by mouth Daily., Disp: , Rfl:   •  metoprolol tartrate (LOPRESSOR) 25 MG tablet, Take 25 mg by mouth 2 (Two) Times a Day., Disp: , Rfl:   •  Mirabegron ER (MYRBETRIQ) 50 MG tablet sustained-release 24 hour 24 hr tablet, Take 50 mg by mouth 2 (two) times a day., Disp: , Rfl:   •  montelukast (SINGULAIR) 10 MG tablet, Take 10 mg by mouth Every Night., Disp: , Rfl:   •  ondansetron ODT (ZOFRAN-ODT) 4 MG disintegrating tablet, Place 1 tablet on the tongue Every 8 (Eight) Hours As Needed for Nausea or Vomiting., Disp: 15 tablet, Rfl: 0  •  ondansetron ODT (ZOFRAN-ODT) 8 MG disintegrating tablet, 8 mg 4 (Four) Times a Day As Needed., Disp: , Rfl:   •  Semaglutide,0.25 or 0.5MG/DOS, (OZEMPIC) 2 MG/1.5ML solution pen-injector, Inject 1 mg under the skin into the appropriate area as directed 1 (One) Time Per Week. Monday, Disp: , Rfl:   •  budesonide (PULMICORT) 0.5 MG/2ML nebulizer solution, Take 2 mL by nebulization 2 (Two)  Times a Day for 30 days. Rinse mouth out after each use, Disp: 120 mL, Rfl: 11  •  levoFLOXacin (Levaquin) 750 MG tablet, Take 1 tablet by mouth Daily., Disp: 7 tablet, Rfl: 0  •  predniSONE (DELTASONE) 20 MG tablet, Take 2 tablets by mouth Daily for 7 days., Disp: 14 tablet, Rfl: 0     Objective   Physical Exam  Constitutional:       General: She is not in acute distress.     Appearance: Normal appearance. She is obese.   HENT:      Right Ear: Hearing normal.      Left Ear: Hearing normal.      Nose: No nasal tenderness or congestion.      Mouth/Throat:      Mouth: Mucous membranes are moist. No oral lesions.   Eyes:      Extraocular Movements: Extraocular movements intact.      Pupils: Pupils are equal, round, and reactive to light.   Neck:      Thyroid: No thyroid mass or thyromegaly.   Cardiovascular:      Rate and Rhythm: Normal rate and regular rhythm.      Pulses: Normal pulses.      Heart sounds: Normal heart sounds. No murmur heard.  Pulmonary:      Effort: Pulmonary effort is normal.      Breath sounds: Decreased breath sounds and rhonchi present. No wheezing or rales.   Abdominal:      General: Bowel sounds are normal. There is no distension.      Palpations: Abdomen is soft.      Tenderness: There is no abdominal tenderness.   Musculoskeletal:      Cervical back: Neck supple.      Right lower leg: No edema.      Left lower leg: No edema.   Lymphadenopathy:      Cervical: No cervical adenopathy.      Upper Body:      Right upper body: No axillary adenopathy.   Skin:     General: Skin is warm and dry.      Findings: No lesion or rash.   Neurological:      General: No focal deficit present.      Mental Status: She is alert and oriented to person, place, and time.   Psychiatric:         Mood and Affect: Affect normal. Mood is not anxious or depressed.         Vital Signs:   /66 (BP Location: Right arm, Patient Position: Sitting, Cuff Size: Large Adult)   Pulse 81   Temp 97.7 °F (36.5 °C) (Temporal)   " Resp 22   Ht 154.9 cm (61\")   Wt 83.5 kg (184 lb)   SpO2 96% Comment: room air  BMI 34.77 kg/m²        Result Review :       Data reviewed: Radiologic studies Chest x-ray 9/26/2021, chest CT 1/9/2023 and My last office note   Procedures        Assessment and Plan    Diagnoses and all orders for this visit:    1. Chronic obstructive pulmonary disease, unspecified COPD type (HCC) (Primary)  -     levoFLOXacin (Levaquin) 750 MG tablet; Take 1 tablet by mouth Daily.  Dispense: 7 tablet; Refill: 0  -     predniSONE (DELTASONE) 20 MG tablet; Take 2 tablets by mouth Daily for 7 days.  Dispense: 14 tablet; Refill: 0    2. Seasonal allergies    3. Dyspnea on exertion    4. Nicotine dependence, cigarettes, in remission    5. Mild intermittent asthma, unspecified whether complicated    6. Severe obesity (BMI 35.0-35.9 with comorbidity) (HCC)    7. Allergic rhinitis, unspecified seasonality, unspecified trigger    8. Subacute cough  -     levoFLOXacin (Levaquin) 750 MG tablet; Take 1 tablet by mouth Daily.  Dispense: 7 tablet; Refill: 0  -     predniSONE (DELTASONE) 20 MG tablet; Take 2 tablets by mouth Daily for 7 days.  Dispense: 14 tablet; Refill: 0    9. Wheezing  -     levoFLOXacin (Levaquin) 750 MG tablet; Take 1 tablet by mouth Daily.  Dispense: 7 tablet; Refill: 0  -     predniSONE (DELTASONE) 20 MG tablet; Take 2 tablets by mouth Daily for 7 days.  Dispense: 14 tablet; Refill: 0    10. Chest congestion  -     levoFLOXacin (Levaquin) 750 MG tablet; Take 1 tablet by mouth Daily.  Dispense: 7 tablet; Refill: 0  -     predniSONE (DELTASONE) 20 MG tablet; Take 2 tablets by mouth Daily for 7 days.  Dispense: 14 tablet; Refill: 0    11.  Continue Perforomist, Pulmicort and Lonhala as prescribed.  Rinse mouth out after each use.  12.  Continue albuterol and nebulizer treatments as needed.  13.  Continue Singulair, Claritin, Flonase and azelastine nasal spray for seasonal allergies and allergic rhinitis.  14.  Encouraged " patient try stay as active as possible.  Recommend 30 minutes of exercise.  15.  Follow-up with 3-4 months, sooner if needed.        Follow Up   Return in about 3 months (around 4/13/2023) for Recheck 3-4 months with Yun.  Patient was given instructions and counseling regarding her condition or for health maintenance advice. Please see specific information pulled into the AVS if appropriate.

## 2023-01-13 ENCOUNTER — OFFICE VISIT (OUTPATIENT)
Dept: PULMONOLOGY | Facility: CLINIC | Age: 73
End: 2023-01-13
Payer: MEDICARE

## 2023-01-13 VITALS
HEART RATE: 81 BPM | WEIGHT: 184 LBS | SYSTOLIC BLOOD PRESSURE: 142 MMHG | OXYGEN SATURATION: 96 % | BODY MASS INDEX: 34.74 KG/M2 | HEIGHT: 61 IN | RESPIRATION RATE: 22 BRPM | TEMPERATURE: 97.7 F | DIASTOLIC BLOOD PRESSURE: 66 MMHG

## 2023-01-13 DIAGNOSIS — R09.89 CHEST CONGESTION: ICD-10-CM

## 2023-01-13 DIAGNOSIS — J30.2 SEASONAL ALLERGIES: ICD-10-CM

## 2023-01-13 DIAGNOSIS — E66.01 SEVERE OBESITY (BMI 35.0-35.9 WITH COMORBIDITY): ICD-10-CM

## 2023-01-13 DIAGNOSIS — F17.211 NICOTINE DEPENDENCE, CIGARETTES, IN REMISSION: ICD-10-CM

## 2023-01-13 DIAGNOSIS — J30.9 ALLERGIC RHINITIS, UNSPECIFIED SEASONALITY, UNSPECIFIED TRIGGER: ICD-10-CM

## 2023-01-13 DIAGNOSIS — R05.2 SUBACUTE COUGH: ICD-10-CM

## 2023-01-13 DIAGNOSIS — J44.9 CHRONIC OBSTRUCTIVE PULMONARY DISEASE, UNSPECIFIED COPD TYPE: Primary | ICD-10-CM

## 2023-01-13 DIAGNOSIS — J45.20 MILD INTERMITTENT ASTHMA, UNSPECIFIED WHETHER COMPLICATED: ICD-10-CM

## 2023-01-13 DIAGNOSIS — R06.2 WHEEZING: ICD-10-CM

## 2023-01-13 DIAGNOSIS — R06.09 DYSPNEA ON EXERTION: ICD-10-CM

## 2023-01-13 PROCEDURE — 99214 OFFICE O/P EST MOD 30 MIN: CPT | Performed by: NURSE PRACTITIONER

## 2023-01-13 RX ORDER — BUDESONIDE, GLYCOPYRROLATE, AND FORMOTEROL FUMARATE 160; 9; 4.8 UG/1; UG/1; UG/1
2 AEROSOL, METERED RESPIRATORY (INHALATION) 2 TIMES DAILY
COMMUNITY

## 2023-01-13 RX ORDER — LEVOTHYROXINE SODIUM 0.07 MG/1
75 TABLET ORAL DAILY
COMMUNITY
Start: 2022-12-15

## 2023-01-13 RX ORDER — BUDESONIDE AND FORMOTEROL FUMARATE DIHYDRATE 160; 4.5 UG/1; UG/1
2 AEROSOL RESPIRATORY (INHALATION) 2 TIMES DAILY
COMMUNITY
Start: 2022-12-19 | End: 2023-01-13

## 2023-01-13 RX ORDER — LEVOFLOXACIN 750 MG/1
750 TABLET ORAL DAILY
Qty: 7 TABLET | Refills: 0 | Status: SHIPPED | OUTPATIENT
Start: 2023-01-13

## 2023-01-13 RX ORDER — PREDNISONE 20 MG/1
40 TABLET ORAL DAILY
Qty: 14 TABLET | Refills: 0 | Status: SHIPPED | OUTPATIENT
Start: 2023-01-13 | End: 2023-01-20

## 2023-05-03 ENCOUNTER — OFFICE VISIT (OUTPATIENT)
Dept: PULMONOLOGY | Facility: CLINIC | Age: 73
End: 2023-05-03
Payer: MEDICARE

## 2023-05-03 VITALS
HEART RATE: 82 BPM | HEIGHT: 61 IN | DIASTOLIC BLOOD PRESSURE: 74 MMHG | SYSTOLIC BLOOD PRESSURE: 176 MMHG | WEIGHT: 193 LBS | TEMPERATURE: 98 F | OXYGEN SATURATION: 93 % | BODY MASS INDEX: 36.44 KG/M2 | RESPIRATION RATE: 20 BRPM

## 2023-05-03 DIAGNOSIS — J44.9 CHRONIC OBSTRUCTIVE PULMONARY DISEASE, UNSPECIFIED COPD TYPE: ICD-10-CM

## 2023-05-03 DIAGNOSIS — J96.01 ACUTE RESPIRATORY FAILURE WITH HYPOXIA: Primary | ICD-10-CM

## 2023-05-03 RX ORDER — BUDESONIDE AND FORMOTEROL FUMARATE DIHYDRATE 160; 4.5 UG/1; UG/1
2 AEROSOL RESPIRATORY (INHALATION) 2 TIMES DAILY
COMMUNITY
Start: 2023-03-15

## 2023-05-03 RX ORDER — FLUTICASONE PROPIONATE 50 MCG
1 SPRAY, SUSPENSION (ML) NASAL 2 TIMES DAILY
COMMUNITY
Start: 2023-01-20

## 2023-05-03 RX ORDER — PREDNISONE 20 MG/1
40 TABLET ORAL DAILY
COMMUNITY
Start: 2023-03-21 | End: 2023-05-03

## 2023-05-03 RX ORDER — HYDROCODONE POLISTIREX AND CHLORPHENIRAMINE POLISTIREX 10; 8 MG/5ML; MG/5ML
SUSPENSION, EXTENDED RELEASE ORAL
COMMUNITY
Start: 2023-04-12

## 2023-05-03 NOTE — PROGRESS NOTES
"Chief Complaint  Mild intermittent asthma , COPD, Cough, and Follow-up (3-4 month f/up )    Subjective        Lora Pastor presents to [facility] PULMONARY & CRITICAL CARE MEDICINE  History of Present Illness  Follow-up for COPD  Doing better  Has had pneumonia since last office visit  Was treated with Levaquin by her primary care provider  Overall doing good currently on 2 L via nasal cannula  Objective   Vital Signs:  /74 (BP Location: Right arm, Patient Position: Sitting, Cuff Size: Adult)   Pulse 82   Temp 98 °F (36.7 °C) (Temporal)   Resp 20   Ht 154.9 cm (61\")   Wt 87.5 kg (193 lb)   SpO2 93% Comment: room air; 2 L's noctural  BMI 36.47 kg/m²   Estimated body mass index is 36.47 kg/m² as calculated from the following:    Height as of this encounter: 154.9 cm (61\").    Weight as of this encounter: 87.5 kg (193 lb).             Physical Exam   Vital Signs Reviewed  General WDWN, Alert, NAD.    Chest:  good aeration, clear to auscultation bilaterally, tympanic to percussion bilaterally, no work of breathing noted  CV: RRR, no MGR, .  EXT:  no clubbing, no cyanosis, no edema, no joint tenderness  Neuro:  A&Ox3, CN grossly intact, no focal deficits.  Skin: No rashes or lesions noted  Result Review :                   Assessment and Plan   Diagnoses and all orders for this visit:    1. Acute respiratory failure with hypoxia (HCC) (Primary)  Assessment & Plan:  Continue oxygen 2 L via nasal cannula      2. Chronic obstructive pulmonary disease, unspecified COPD type  Assessment & Plan:  Continue albuterol  Continue Breztri        If patient with persistent pneumonia again next office visit will benefit from having immunoglobulin levels checked along with barium swallow       Follow Up   Return in about 4 months (around 9/3/2023).  Patient was given instructions and counseling regarding her condition or for health maintenance advice. Please see specific information pulled into the " AVS if appropriate.

## 2023-06-14 RX ORDER — ALBUTEROL SULFATE 90 UG/1
2 AEROSOL, METERED RESPIRATORY (INHALATION) EVERY 4 HOURS PRN
Qty: 18 G | Refills: 3 | Status: SHIPPED | OUTPATIENT
Start: 2023-06-14

## 2023-09-06 ENCOUNTER — OFFICE VISIT (OUTPATIENT)
Dept: PULMONOLOGY | Facility: CLINIC | Age: 73
End: 2023-09-06
Payer: MEDICARE

## 2023-09-06 VITALS
TEMPERATURE: 97.3 F | BODY MASS INDEX: 36.44 KG/M2 | HEIGHT: 61 IN | WEIGHT: 193 LBS | DIASTOLIC BLOOD PRESSURE: 76 MMHG | SYSTOLIC BLOOD PRESSURE: 131 MMHG | HEART RATE: 72 BPM | RESPIRATION RATE: 12 BRPM | OXYGEN SATURATION: 98 %

## 2023-09-06 DIAGNOSIS — F17.211 NICOTINE DEPENDENCE, CIGARETTES, IN REMISSION: ICD-10-CM

## 2023-09-06 DIAGNOSIS — R06.09 DYSPNEA ON EXERTION: ICD-10-CM

## 2023-09-06 DIAGNOSIS — J30.2 SEASONAL ALLERGIES: ICD-10-CM

## 2023-09-06 DIAGNOSIS — E66.01 SEVERE OBESITY (BMI 35.0-39.9) WITH COMORBIDITY: ICD-10-CM

## 2023-09-06 DIAGNOSIS — J45.20 MILD INTERMITTENT ASTHMA WITHOUT COMPLICATION: Primary | ICD-10-CM

## 2023-09-06 DIAGNOSIS — J44.9 CHRONIC OBSTRUCTIVE PULMONARY DISEASE, UNSPECIFIED COPD TYPE: ICD-10-CM

## 2023-09-06 DIAGNOSIS — G47.34 NOCTURNAL HYPOXIA: ICD-10-CM

## 2023-09-06 DIAGNOSIS — J30.9 ALLERGIC RHINITIS, UNSPECIFIED SEASONALITY, UNSPECIFIED TRIGGER: ICD-10-CM

## 2023-09-06 DIAGNOSIS — R05.3 CHRONIC COUGH: ICD-10-CM

## 2023-09-06 RX ORDER — HYDROCODONE POLISTIREX AND CHLORPHENIRAMINE POLISTIREX 10; 8 MG/5ML; MG/5ML
5 SUSPENSION, EXTENDED RELEASE ORAL EVERY 12 HOURS PRN
Qty: 120 ML | Refills: 0 | Status: SHIPPED | OUTPATIENT
Start: 2023-09-06

## 2023-09-06 RX ORDER — FLUCONAZOLE 100 MG/1
TABLET ORAL
COMMUNITY
Start: 2023-09-05

## 2023-09-06 RX ORDER — NYSTATIN 100000 [USP'U]/G
POWDER TOPICAL
COMMUNITY
Start: 2023-09-05

## 2023-09-06 RX ORDER — DEXTROMETHORPHAN HYDROBROMIDE AND PROMETHAZINE HYDROCHLORIDE 15; 6.25 MG/5ML; MG/5ML
SYRUP ORAL
COMMUNITY
Start: 2023-05-17

## 2023-09-06 NOTE — PROGRESS NOTES
Primary Care Provider  Darryl Casarez MD     Referring Provider  No ref. provider found     Chief Complaint  Follow-up (4 Month follow up), Asthma, COPD, Cough, and Shortness of Breath    Subjective          Lora Pastor presents to [facility] PULMONARY [facility] CRITICAL CARE MEDICINE  History of Present Illness  Lora Pastor is a 72 y.o. female patient of Dr. Malcolm here for management of COPD, mild intermittent asthma, seasonal allergies, dyspnea on exertion and tobacco abuse of cigarettes in remission.     Patient states she is doing okay since her last office visit.  She denies using any antibiotics or steroids for her lungs.  She denies any current fevers or chills.  Her shortness of breath is mild to moderate in severity, worse with exertion and improved with rest.  She continues to use Pulmicort and Perforomist and her nebulizer if she is at home.  Patient states that she does have a Breztri inhaler that she will use if she is away from home and due for a treatment.  Patient is aware to not use these medications on the same days, as they are duplicate medications.  She will intermittently use her albuterol for episodes of shortness of breath.  She continues to take Claritin, Singulair, Flonase and azelastine nasal spray for seasonal allergies and allergic rhinitis.  Patient has 2 L of oxygen that she typically wears at night.  Patient states that she does seldomly wear it during the day.  She does complain of a dry cough that is constant and worse at night.  Patient states that it does keep her up at night.  Cough drops help occasionally.  Tessalon Perles do not help the patient's symptoms.  She has had Tussionex in the past with good relief in symptoms.  Patient states that she will typically only use Tussidex at night.  Overall, patient is doing well and has no additional concerns at this time.  She is able to perform her ADLs without difficulty.  She is up-to-date with her  COVID vaccine and will be due for a flu vaccine this fall.  She declines a pneumonia vaccine.     Her history of smoking is   Tobacco Use: Medium Risk    Smoking Tobacco Use: Former    Smokeless Tobacco Use: Never    Passive Exposure: Past   .    Review of Systems   Constitutional:  Negative for chills, fatigue, fever, unexpected weight gain and unexpected weight loss.   HENT:  Negative for congestion (Nasal), hearing loss, mouth sores, nosebleeds, postnasal drip, sore throat and trouble swallowing.    Respiratory:  Positive for cough and shortness of breath. Negative for apnea and wheezing.         Negative for Hemoptysis     Cardiovascular:  Negative for chest pain, palpitations and leg swelling.   Gastrointestinal:  Negative for constipation, diarrhea, nausea, vomiting and GERD.   Skin:  Negative for color change.        Negative for cyanosis   Neurological:  Negative for syncope, weakness, numbness and headache.    Sleep: Negative for Excessive daytime sleepiness  Negative for morning headaches  Negative for Snoring    History reviewed. No pertinent family history.     Social History     Socioeconomic History    Marital status:    Tobacco Use    Smoking status: Former     Packs/day: 0.50     Years: 5.00     Pack years: 2.50     Types: Cigarettes     Start date:      Quit date: 1970     Years since quittin.7     Passive exposure: Past    Smokeless tobacco: Never   Vaping Use    Vaping Use: Never used   Substance and Sexual Activity    Alcohol use: Never    Drug use: Never        Past Medical History:   Diagnosis Date    Asthma     COPD (chronic obstructive pulmonary disease)     Coronary artery disease     Diabetes mellitus     History of coronary angioplasty with insertion of stent         Immunization History   Administered Date(s) Administered    COVID-19 (MODERNA) 1st,2nd,3rd Dose Monovalent 2022, 2022    COVID-19 (MODERNA) BIVALENT 12+YRS 2023    FLUAD TRI 65YR+ 10/31/2017     Fluad Quad 65+ 11/23/2021, 11/28/2022    Fluzone High Dose =>65 Years ([employer] ONLY) 10/19/2016, 10/30/2022    Fluzone High-Dose 65+yrs 11/19/2020    Pneumococcal Conjugate 13-Valent (PCV13) 10/31/2017    Pneumococcal Polysaccharide (PPSV23) 01/01/2017    Shingrix 11/23/2020, 01/22/2021    Zostavax 10/19/2016         Allergies   Allergen Reactions    Lisinopril Unknown - Low Severity    Codeine Unknown - Low Severity     swells    Keflex [Cephalexin] Unknown - Low Severity     Causes nausea    Nubain [Nalbuphine] Swelling    Penicillins Unknown - Low Severity     Breaks her out          Current Outpatient Medications:     albuterol (PROVENTIL) (2.5 MG/3ML) 0.083% nebulizer solution, Take 2.5 mg by nebulization Every 4 (Four) Hours As Needed for Wheezing or Shortness of Air., Disp: 180 mL, Rfl: 3    albuterol sulfate  (90 Base) MCG/ACT inhaler, Inhale 2 puffs Every 4 (Four) Hours As Needed for Wheezing., Disp: 18 g, Rfl: 3    allopurinol (ZYLOPRIM) 300 MG tablet, Take 1 tablet by mouth 2 (Two) Times a Day., Disp: , Rfl:     apixaban (ELIQUIS) 5 MG tablet tablet, Take 1 tablet by mouth 2 (two) times a day., Disp: , Rfl:     atorvastatin (LIPITOR) 20 MG tablet, Take 1 tablet by mouth every night at bedtime., Disp: , Rfl:     azelastine (ASTELIN) 0.1 % nasal spray, 1 spray 2 (Two) Times a Day., Disp: , Rfl:     Budeson-Glycopyrrol-Formoterol (Breztri Aerosphere) 160-9-4.8 MCG/ACT aerosol inhaler, Inhale 2 puffs 2 (Two) Times a Day., Disp: , Rfl:     budesonide (PULMICORT) 0.5 MG/2ML nebulizer solution, Take 2 mL by nebulization 2 (Two) Times a Day for 30 days. Rinse mouth out after each use, Disp: 120 mL, Rfl: 11    Diclofenac Sodium (VOLTAREN) 1 % gel gel, Apply  topically to the appropriate area as directed., Disp: , Rfl:     ezetimibe (ZETIA) 10 MG tablet, Take 1 tablet by mouth Daily., Disp: , Rfl:     famotidine (PEPCID) 20 MG tablet, Take 1 tablet by mouth 3 (Three) Times a Day., Disp: , Rfl:      fluconazole (DIFLUCAN) 100 MG tablet, , Disp: , Rfl:     fluticasone (FLONASE) 50 MCG/ACT nasal spray, 1 spray by Each Nare route 2 (Two) Times a Day., Disp: , Rfl:     fluticasone (VERAMYST) 27.5 MCG/SPRAY nasal spray, 2 sprays into the nostril(s) as directed by provider Daily., Disp: , Rfl:     formoterol (PERFOROMIST) 20 MCG/2ML nebulizer solution, Take 2 mL by nebulization 2 (Two) Times a Day., Disp: 60 each, Rfl: 11    gabapentin (NEURONTIN) 400 MG capsule, Take 1 capsule by mouth 3 (Three) Times a Day., Disp: , Rfl:     levothyroxine (SYNTHROID, LEVOTHROID) 75 MCG tablet, Take 1 tablet by mouth Daily., Disp: , Rfl:     loratadine (CLARITIN) 10 MG tablet, Take 1 tablet by mouth Daily., Disp: , Rfl:     metoprolol tartrate (LOPRESSOR) 25 MG tablet, Take 1 tablet by mouth 2 (Two) Times a Day., Disp: , Rfl:     Mirabegron ER (MYRBETRIQ) 50 MG tablet sustained-release 24 hour 24 hr tablet, Take 50 mg by mouth 2 (two) times a day., Disp: , Rfl:     montelukast (SINGULAIR) 10 MG tablet, Take 1 tablet by mouth Every Night., Disp: , Rfl:     nystatin (MYCOSTATIN) 262910 UNIT/GM powder, , Disp: , Rfl:     ondansetron ODT (ZOFRAN-ODT) 4 MG disintegrating tablet, Place 1 tablet on the tongue Every 8 (Eight) Hours As Needed for Nausea or Vomiting., Disp: 15 tablet, Rfl: 0    Semaglutide,0.25 or 0.5MG/DOS, (OZEMPIC) 2 MG/1.5ML solution pen-injector, Inject 1 mg under the skin into the appropriate area as directed 1 (One) Time Per Week. Monday, Disp: , Rfl:     Glycopyrrolate (Lonhala Magnair Refill Kit) 25 MCG/ML solution, Inhale 2 puffs 2 (two) times a day. (Patient not taking: Reported on 9/6/2023), Disp: , Rfl:     Hydrocod Sujit-Chlorphe Sujit ER (TUSSIONEX PENNKINETIC) 10-8 MG/5ML ER suspension, TAKE ONE TEASPOONFUL BY MOUTH TWICE DAILY (Patient not taking: Reported on 9/6/2023), Disp: , Rfl:     Hydrocod Sujit-Chlorphe Sujit ER (TUSSIONEX PENNKINETIC) 10-8 MG/5ML ER suspension, Take 5 mL by mouth Every 12 (Twelve) Hours  As Needed for Cough., Disp: 120 mL, Rfl: 0    levothyroxine (SYNTHROID, LEVOTHROID) 88 MCG tablet, Take 1 tablet by mouth Daily. (Patient not taking: Reported on 9/6/2023), Disp: , Rfl:     ondansetron ODT (ZOFRAN-ODT) 8 MG disintegrating tablet, 1 tablet 4 (Four) Times a Day As Needed., Disp: , Rfl:     promethazine-dextromethorphan (PROMETHAZINE-DM) 6.25-15 MG/5ML syrup, TAKE 10 ML BY MOUTH EVERY 4 TO 6 HOURS AS NEEDED FOR COUGH. (Patient not taking: Reported on 9/6/2023), Disp: , Rfl:      Objective   Physical Exam  Constitutional:       General: She is not in acute distress.     Appearance: Normal appearance. She is overweight.   HENT:      Right Ear: Hearing normal.      Left Ear: Hearing normal.      Nose: No nasal tenderness or congestion.      Mouth/Throat:      Mouth: Mucous membranes are moist. No oral lesions.   Eyes:      Extraocular Movements: Extraocular movements intact.      Pupils: Pupils are equal, round, and reactive to light.   Neck:      Thyroid: No thyroid mass or thyromegaly.   Cardiovascular:      Rate and Rhythm: Normal rate and regular rhythm.      Pulses: Normal pulses.      Heart sounds: Normal heart sounds. No murmur heard.  Pulmonary:      Effort: Pulmonary effort is normal.      Breath sounds: Normal breath sounds. No wheezing, rhonchi or rales.   Musculoskeletal:      Cervical back: Neck supple.      Right lower leg: No edema.      Left lower leg: No edema.   Lymphadenopathy:      Cervical: No cervical adenopathy.      Upper Body:      Right upper body: No axillary adenopathy.   Skin:     General: Skin is warm and dry.      Findings: No lesion or rash.   Neurological:      General: No focal deficit present.      Mental Status: She is alert and oriented to person, place, and time.   Psychiatric:         Mood and Affect: Affect normal. Mood is not anxious or depressed.       Vital Signs:   /76 (BP Location: Left arm, Patient Position: Sitting, Cuff Size: Adult)   Pulse 72   Temp  "97.3 °F (36.3 °C) (Tympanic)   Resp 12   Ht 154.9 cm (61\")   Wt 87.5 kg (193 lb)   SpO2 98% Comment: Room air  BMI 36.47 kg/m²        Result Review :   The following data was reviewed by: EMMA Sandoval on 09/06/2023:    Data reviewed : Radiologic studies chest CT 1/9/2023 and Dr. Malcolm's last office note    Procedures        Assessment and Plan    Diagnoses and all orders for this visit:    1. Mild intermittent asthma without complication (Primary)    2. Chronic obstructive pulmonary disease, unspecified COPD type    3. Chronic cough  -     Hydrocod Sujit-Chlorphe Sujit ER (TUSSIONEX PENNKINETIC) 10-8 MG/5ML ER suspension; Take 5 mL by mouth Every 12 (Twelve) Hours As Needed for Cough.  Dispense: 120 mL; Refill: 0    4. Seasonal allergies    5. Allergic rhinitis, unspecified seasonality, unspecified trigger    6. Dyspnea on exertion    7. Nocturnal hypoxia    8. Nicotine dependence, cigarettes, in remission    9. Severe obesity (BMI 35.0-39.9) with comorbidity    10.  Continue Breztri or Pulmicort and Perforomist as prescribed.  Rinse mouth out after each use.  Do not use his medications on the same day as they are duplicate therapy.  Patient verbalizes understanding.  11.  Continue albuterol as needed.  12.  Continue Claritin, Singulair, Flonase and azelastine nasal spray for seasonal allergies and allergic rhinitis.  13.  Encourage patient try to stay as active as possible.  Recommend 30 minutes of activity.  14.  Continue supplemental oxygen to maintain saturations at or above 89%.  15.  Follow-up in 4 months, sooner if needed.        Follow Up   Return in about 4 months (around 1/6/2024) for Recheck with Juliann.  Patient was given instructions and counseling regarding her condition or for health maintenance advice. Please see specific information pulled into the AVS if appropriate.       "

## 2023-09-06 NOTE — PATIENT INSTRUCTIONS
COPD and Physical Activity  Chronic obstructive pulmonary disease (COPD) is a long-term, or chronic, condition that affects the lungs. COPD is a general term that can be used to describe many problems that cause inflammation of the lungs and limit airflow. These conditions include chronic bronchitis and emphysema.  The main symptom of COPD is shortness of breath, which makes it harder to do even simple tasks. This can also make it harder to exercise and stay active. Talk with your health care provider about treatments to help you breathe better and actions you can take to prevent breathing problems during physical activity.  What are the benefits of exercising when you have COPD?  Exercising regularly is an important part of a healthy lifestyle. You can still exercise and do physical activities even though you have COPD. Exercise and physical activity improve your shortness of breath by increasing blood flow (circulation). This causes your heart to pump more oxygen through your body. Moderate exercise can:  Improve oxygen use.  Increase your energy level.  Help with shortness of breath.  Strengthen your breathing muscles.  Improve heart health.  Help with sleep.  Improve your self-esteem and feelings of self-worth.  Lower depression, stress, and anxiety.  Exercise can benefit everyone with COPD. The severity of your disease may affect how hard you can exercise, especially at first, but everyone can benefit. Talk with your health care provider about how much exercise is safe for you, and which activities and exercises are safe for you.  What actions can I take to prevent breathing problems during physical activity?  Sign up for a pulmonary rehabilitation program. This type of program may include:  Education about lung diseases.  Exercise classes that teach you how to exercise and be more active while improving your breathing. This usually involves:  Exercise using your lower extremities, such as a stationary  bicycle.  About 30 minutes of exercise, 2 to 5 times per week, for 6 to 12 weeks.  Strength training, such as push-ups or leg lifts.  Nutrition education.  Group classes in which you can talk with others who also have COPD and learn ways to manage stress.  If you use an oxygen tank, you should use it while you exercise. Work with your health care provider to adjust your oxygen for your physical activity. Your resting flow rate is different from your flow rate during physical activity.  How to manage your breathing while exercising  While you are exercising:  Take slow breaths.  Pace yourself, and do nottry to go too fast.  Purse your lips while breathing out. Pursing your lips is similar to a kissing or whistling position.  If doing exercise that uses a quick burst of effort, such as weight lifting:  Breathe in before starting the exercise.  Breathe out during the hardest part of the exercise, such as raising the weights.  Where to find support  You can find support for exercising with COPD from:  Your health care provider.  A pulmonary rehabilitation program.  Your local health department or community health programs.  Support groups, either online or in-person. Your health care provider may be able to recommend support groups.  Where to find more information  You can find more information about exercising with COPD from:  American Lung Association: lung.org  COPD Foundation: copdfoundation.org  Contact a health care provider if:  Your symptoms get worse.  You have nausea.  You have a fever.  You want to start a new exercise program or a new activity.  Get help right away if:  You have chest pain.  You cannot breathe.  These symptoms may represent a serious problem that is an emergency. Do not wait to see if the symptoms will go away. Get medical help right away. Call your local emergency services (911 in the [place] Do not drive yourself to the hospital.  Summary  COPD is a general term that can be used to describe  many different lung problems that cause lung inflammation and limit airflow. This includes chronic bronchitis and emphysema.  Exercise and physical activity improve your shortness of breath by increasing blood flow (circulation). This causes your heart to provide more oxygen to your body.  Contact your health care provider before starting any exercise program or new activity. Ask your health care provider what exercises and activities are safe for you.  This information is not intended to replace advice given to you by your health care provider. Make sure you discuss any questions you have with your health care provider.  Document Revised: 10/26/2021 Document Reviewed: 10/26/2021  [employer] Patient Education © 2023 [employer]

## 2024-01-31 ENCOUNTER — OFFICE VISIT (OUTPATIENT)
Dept: PULMONOLOGY | Facility: CLINIC | Age: 74
End: 2024-01-31
Payer: MEDICARE

## 2024-01-31 VITALS
BODY MASS INDEX: 33.79 KG/M2 | RESPIRATION RATE: 18 BRPM | SYSTOLIC BLOOD PRESSURE: 150 MMHG | HEART RATE: 73 BPM | DIASTOLIC BLOOD PRESSURE: 90 MMHG | OXYGEN SATURATION: 92 % | HEIGHT: 61 IN | WEIGHT: 179 LBS

## 2024-01-31 DIAGNOSIS — J44.9 CHRONIC OBSTRUCTIVE PULMONARY DISEASE, UNSPECIFIED COPD TYPE: Primary | ICD-10-CM

## 2024-01-31 NOTE — ASSESSMENT & PLAN NOTE
Still having some persistent symptoms at this time only taking albuterol and budesonide due to cost    At this time we will give patient a sample of Breztri    We can attempt to write for this medication if her insurance will allow

## 2024-01-31 NOTE — PROGRESS NOTES
"Chief Complaint  Mild intermittent asthma, COPD, Follow-up (4 Month ), Shortness of Breath (With ambulation ), and Cough    Subjective        Lora Pastor presents to [facility] PULMONARY & CRITICAL CARE MEDICINE  History of Present Illness  Follow-up for COPD  Overall doing well  Feels that the Muriel helped her breathing more than any other medication  Unable to take Muriel at this time due to cost  Objective   Vital Signs:  /90 (BP Location: Left arm, Patient Position: Sitting, Cuff Size: Adult)   Pulse 73   Resp 18   Ht 154.9 cm (61\")   Wt 81.2 kg (179 lb)   SpO2 92% Comment: Was at 88 after walking to the room, after sitting patient came up to 90 on room air. Uses 2 at night  BMI 33.82 kg/m²   Estimated body mass index is 33.82 kg/m² as calculated from the following:    Height as of this encounter: 154.9 cm (61\").    Weight as of this encounter: 81.2 kg (179 lb).             Physical Exam   Vital Signs Reviewed  General WDWN, Alert, NAD.    Chest:  good aeration, clear to auscultation bilaterally, tympanic to percussion bilaterally, no work of breathing noted  CV: RRR, no MGR, .  EXT:  no clubbing, no cyanosis, no edema, no joint tenderness  Neuro:  A&Ox3, CN grossly intact, no focal deficits.  Skin: No rashes or lesions noted  Result Review :                     Assessment and Plan     Diagnoses and all orders for this visit:    1. Chronic obstructive pulmonary disease, unspecified COPD type (Primary)  Assessment & Plan:  Still having some persistent symptoms at this time only taking albuterol and budesonide due to cost    At this time we will give patient a sample of Breztri    We can attempt to write for this medication if her insurance will allow        Patient up-to-date on pulmonary specific vaccines  I have offered RSV vaccine to the patient she politely declines today       Follow Up     Return in about 6 months (around 7/31/2024).  Patient was given instructions and " counseling regarding her condition or for health maintenance advice. Please see specific information pulled into the AVS if appropriate.

## 2024-02-16 RX ORDER — BUDESONIDE, GLYCOPYRROLATE, AND FORMOTEROL FUMARATE 160; 9; 4.8 UG/1; UG/1; UG/1
2 AEROSOL, METERED RESPIRATORY (INHALATION) 2 TIMES DAILY
Qty: 2 EACH | Refills: 0 | COMMUNITY
Start: 2024-02-16 | End: 2024-02-17

## 2024-09-03 RX ORDER — ALBUTEROL SULFATE 90 UG/1
AEROSOL, METERED RESPIRATORY (INHALATION)
Qty: 18 G | Refills: 3 | Status: SHIPPED | OUTPATIENT
Start: 2024-09-03

## 2024-12-20 RX ORDER — ALBUTEROL SULFATE 90 UG/1
INHALANT RESPIRATORY (INHALATION)
Qty: 18 G | Refills: 3 | OUTPATIENT
Start: 2024-12-20

## 2025-01-11 ENCOUNTER — APPOINTMENT (OUTPATIENT)
Dept: GENERAL RADIOLOGY | Facility: HOSPITAL | Age: 75
End: 2025-01-11
Payer: MEDICARE

## 2025-01-11 ENCOUNTER — HOSPITAL ENCOUNTER (EMERGENCY)
Facility: HOSPITAL | Age: 75
Discharge: HOME | End: 2025-01-11
Attending: EMERGENCY MEDICINE
Payer: MEDICARE

## 2025-01-11 VITALS
OXYGEN SATURATION: 95 % | DIASTOLIC BLOOD PRESSURE: 72 MMHG | WEIGHT: 188.27 LBS | TEMPERATURE: 99.4 F | HEIGHT: 61 IN | BODY MASS INDEX: 35.55 KG/M2 | RESPIRATION RATE: 20 BRPM | SYSTOLIC BLOOD PRESSURE: 117 MMHG | HEART RATE: 79 BPM

## 2025-01-11 DIAGNOSIS — J10.1 INFLUENZA A: Primary | ICD-10-CM

## 2025-01-11 LAB
ALBUMIN SERPL-MCNC: 3.5 G/DL (ref 3.5–5.2)
ALBUMIN/GLOB SERPL: 1.3 G/DL
ALP SERPL-CCNC: 71 U/L (ref 39–117)
ALT SERPL W P-5'-P-CCNC: 19 U/L (ref 1–33)
ANION GAP SERPL CALCULATED.3IONS-SCNC: 10.9 MMOL/L (ref 5–15)
ARTERIAL PATENCY WRIST A: ABNORMAL
AST SERPL-CCNC: 33 U/L (ref 1–32)
ATMOSPHERIC PRESS: 744.6 MMHG
BACTERIA UR QL AUTO: ABNORMAL /HPF
BASE EXCESS BLDA CALC-SCNC: -2.6 MMOL/L (ref -2–2)
BASOPHILS # BLD AUTO: 0.03 10*3/MM3 (ref 0–0.2)
BASOPHILS NFR BLD AUTO: 0.5 % (ref 0–1.5)
BDY SITE: ABNORMAL
BILIRUB SERPL-MCNC: 0.7 MG/DL (ref 0–1.2)
BILIRUB UR QL STRIP: NEGATIVE
BUN SERPL-MCNC: 16 MG/DL (ref 8–23)
BUN/CREAT SERPL: 11.9 (ref 7–25)
CALCIUM SPEC-SCNC: 8.8 MG/DL (ref 8.6–10.5)
CHLORIDE SERPL-SCNC: 99 MMOL/L (ref 98–107)
CLARITY UR: CLEAR
CO2 SERPL-SCNC: 23.1 MMOL/L (ref 22–29)
COLOR UR: YELLOW
CREAT SERPL-MCNC: 1.35 MG/DL (ref 0.57–1)
DEPRECATED RDW RBC AUTO: 52.3 FL (ref 37–54)
EGFRCR SERPLBLD CKD-EPI 2021: 41.3 ML/MIN/1.73
EOSINOPHIL # BLD AUTO: 0.03 10*3/MM3 (ref 0–0.4)
EOSINOPHIL NFR BLD AUTO: 0.5 % (ref 0.3–6.2)
ERYTHROCYTE [DISTWIDTH] IN BLOOD BY AUTOMATED COUNT: 14.6 % (ref 12.3–15.4)
FLUAV SUBTYP SPEC NAA+PROBE: DETECTED
FLUBV RNA ISLT QL NAA+PROBE: NOT DETECTED
GEN 5 1HR TROPONIN T REFLEX: 27 NG/L
GLOBULIN UR ELPH-MCNC: 2.6 GM/DL
GLUCOSE SERPL-MCNC: 123 MG/DL (ref 65–99)
GLUCOSE UR STRIP-MCNC: NEGATIVE MG/DL
HCO3 BLDA-SCNC: 19.9 MMOL/L (ref 22–26)
HCT VFR BLD AUTO: 40.2 % (ref 34–46.6)
HCT VFR BLD CALC: 40 % (ref 38–51)
HEMODILUTION: NO
HGB BLD-MCNC: 12.7 G/DL (ref 12–15.9)
HGB BLDA-MCNC: 13.4 G/DL (ref 12–18)
HGB UR QL STRIP.AUTO: NEGATIVE
HOLD SPECIMEN: NORMAL
HOLD SPECIMEN: NORMAL
HYALINE CASTS UR QL AUTO: ABNORMAL /LPF
IMM GRANULOCYTES # BLD AUTO: 0.02 10*3/MM3 (ref 0–0.05)
IMM GRANULOCYTES NFR BLD AUTO: 0.3 % (ref 0–0.5)
KETONES UR QL STRIP: ABNORMAL
LEUKOCYTE ESTERASE UR QL STRIP.AUTO: NEGATIVE
LYMPHOCYTES # BLD AUTO: 1.8 10*3/MM3 (ref 0.7–3.1)
LYMPHOCYTES NFR BLD AUTO: 29.5 % (ref 19.6–45.3)
MCH RBC QN AUTO: 30.5 PG (ref 26.6–33)
MCHC RBC AUTO-ENTMCNC: 31.6 G/DL (ref 31.5–35.7)
MCV RBC AUTO: 96.4 FL (ref 79–97)
MODALITY: ABNORMAL
MONOCYTES # BLD AUTO: 0.47 10*3/MM3 (ref 0.1–0.9)
MONOCYTES NFR BLD AUTO: 7.7 % (ref 5–12)
NEUTROPHILS NFR BLD AUTO: 3.75 10*3/MM3 (ref 1.7–7)
NEUTROPHILS NFR BLD AUTO: 61.5 % (ref 42.7–76)
NITRITE UR QL STRIP: NEGATIVE
NRBC BLD AUTO-RTO: 0 /100 WBC (ref 0–0.2)
NT-PROBNP SERPL-MCNC: 431.1 PG/ML (ref 0–900)
PCO2 BLDA: 27.8 MM HG (ref 35–45)
PH BLDA: 7.46 PH UNITS (ref 7.35–7.45)
PH UR STRIP.AUTO: 5.5 [PH] (ref 5–8)
PLATELET # BLD AUTO: 174 10*3/MM3 (ref 140–450)
PMV BLD AUTO: 11.2 FL (ref 6–12)
PO2 BLDA: 65 MM HG (ref 80–100)
POTASSIUM SERPL-SCNC: 4.3 MMOL/L (ref 3.5–5.2)
PROT SERPL-MCNC: 6.1 G/DL (ref 6–8.5)
PROT UR QL STRIP: ABNORMAL
QT INTERVAL: 350 MS
QTC INTERVAL: 426 MS
RBC # BLD AUTO: 4.17 10*6/MM3 (ref 3.77–5.28)
RBC # UR STRIP: ABNORMAL /HPF
REF LAB TEST METHOD: ABNORMAL
RSV RNA NPH QL NAA+NON-PROBE: NOT DETECTED
SAO2 % BLDCOA: 94 % (ref 95–99)
SARS-COV-2 RNA RESP QL NAA+PROBE: NOT DETECTED
SODIUM SERPL-SCNC: 133 MMOL/L (ref 136–145)
SP GR UR STRIP: 1.02 (ref 1–1.03)
SQUAMOUS #/AREA URNS HPF: ABNORMAL /HPF
TROPONIN T % DELTA: 0 %
TROPONIN T NUMERIC DELTA: 0 NG/L
TROPONIN T SERPL HS-MCNC: 27 NG/L
UROBILINOGEN UR QL STRIP: ABNORMAL
WBC # UR STRIP: ABNORMAL /HPF
WBC NRBC COR # BLD AUTO: 6.1 10*3/MM3 (ref 3.4–10.8)
WHOLE BLOOD HOLD COAG: NORMAL
WHOLE BLOOD HOLD SPECIMEN: NORMAL

## 2025-01-11 PROCEDURE — 81001 URINALYSIS AUTO W/SCOPE: CPT | Performed by: EMERGENCY MEDICINE

## 2025-01-11 PROCEDURE — 96374 THER/PROPH/DIAG INJ IV PUSH: CPT

## 2025-01-11 PROCEDURE — 36600 WITHDRAWAL OF ARTERIAL BLOOD: CPT

## 2025-01-11 PROCEDURE — 80053 COMPREHEN METABOLIC PANEL: CPT | Performed by: EMERGENCY MEDICINE

## 2025-01-11 PROCEDURE — 71045 X-RAY EXAM CHEST 1 VIEW: CPT

## 2025-01-11 PROCEDURE — 36415 COLL VENOUS BLD VENIPUNCTURE: CPT | Performed by: EMERGENCY MEDICINE

## 2025-01-11 PROCEDURE — 93005 ELECTROCARDIOGRAM TRACING: CPT | Performed by: EMERGENCY MEDICINE

## 2025-01-11 PROCEDURE — 85025 COMPLETE CBC W/AUTO DIFF WBC: CPT | Performed by: EMERGENCY MEDICINE

## 2025-01-11 PROCEDURE — 99284 EMERGENCY DEPT VISIT MOD MDM: CPT

## 2025-01-11 PROCEDURE — 84484 ASSAY OF TROPONIN QUANT: CPT | Performed by: EMERGENCY MEDICINE

## 2025-01-11 PROCEDURE — 83880 ASSAY OF NATRIURETIC PEPTIDE: CPT | Performed by: EMERGENCY MEDICINE

## 2025-01-11 PROCEDURE — 87637 SARSCOV2&INF A&B&RSV AMP PRB: CPT | Performed by: EMERGENCY MEDICINE

## 2025-01-11 PROCEDURE — 82803 BLOOD GASES ANY COMBINATION: CPT

## 2025-01-11 PROCEDURE — 25010000002 ONDANSETRON PER 1 MG: Performed by: EMERGENCY MEDICINE

## 2025-01-11 RX ORDER — ONDANSETRON 4 MG/1
4 TABLET, ORALLY DISINTEGRATING ORAL EVERY 6 HOURS PRN
Qty: 15 TABLET | Refills: 0 | Status: SHIPPED | OUTPATIENT
Start: 2025-01-11 | End: 2025-01-11

## 2025-01-11 RX ORDER — SODIUM CHLORIDE 0.9 % (FLUSH) 0.9 %
10 SYRINGE (ML) INJECTION AS NEEDED
Status: DISCONTINUED | OUTPATIENT
Start: 2025-01-11 | End: 2025-01-11 | Stop reason: HOSPADM

## 2025-01-11 RX ORDER — ONDANSETRON 4 MG/1
4 TABLET, ORALLY DISINTEGRATING ORAL EVERY 6 HOURS PRN
Qty: 15 TABLET | Refills: 0 | Status: SHIPPED | OUTPATIENT
Start: 2025-01-11

## 2025-01-11 RX ORDER — ONDANSETRON 2 MG/ML
4 INJECTION INTRAMUSCULAR; INTRAVENOUS ONCE
Status: COMPLETED | OUTPATIENT
Start: 2025-01-11 | End: 2025-01-11

## 2025-01-11 RX ADMIN — ONDANSETRON 4 MG: 2 INJECTION INTRAMUSCULAR; INTRAVENOUS at 17:21

## 2025-01-11 NOTE — DISCHARGE INSTRUCTIONS
Return to the emergency department mediately for worsening difficulty breathing.  Stay well-hydrated.

## 2025-01-11 NOTE — ED PROVIDER NOTES
"Time: 2:55 PM EST  Date of encounter:  2025  Independent Historian/Clinical History and Information was obtained by:   Patient and Family    History is limited by: Altered Mental Status    Chief Complaint: Shortness of breath, altered mental status      History of Present Illness:  Patient is a 74 y.o. year old female who presents to the emergency department for evaluation of shortness of breath, altered mental status.  Ongoing issue for the past 3 days.  Patient was actually seen at outside hospital [facility] 2 days ago on 2025.  CT head negative along with negative urinalysis.  Patient was seen as a stroke alert based on EMR review.  Patient and family state that the symptoms seem to come on right after she left the  home for the death of her niece.  Patient had vomiting and was amnestic for several hours shortly after that event.  Here in the emergency department the patient's main complaint is related to that vomiting along with diarrhea starting today.  She denies any chest or abdominal pain.  No reported fevers.  Patient never mentions feeling short of breath but when specifically asked she says she might be \"a little bit worse than normal, but not much\".      Patient Care Team  Primary Care Provider: Darryl Casarez MD    Past Medical History:     Allergies   Allergen Reactions    Lisinopril Unknown - Low Severity    Codeine Unknown - Low Severity     swells    Keflex [Cephalexin] Unknown - Low Severity     Causes nausea    Nubain [Nalbuphine] Swelling    Penicillins Unknown - Low Severity     Breaks her out     Past Medical History:   Diagnosis Date    Asthma     COPD (chronic obstructive pulmonary disease)     Coronary artery disease     Diabetes mellitus     History of coronary angioplasty with insertion of stent      Past Surgical History:   Procedure Laterality Date    HYSTERECTOMY      REPLACEMENT TOTAL KNEE       History reviewed. No pertinent family history.    Home " Medications:  Prior to Admission medications    Medication Sig Start Date End Date Taking? Authorizing Provider   albuterol (PROVENTIL) (2.5 MG/3ML) 0.083% nebulizer solution Take 2.5 mg by nebulization Every 4 (Four) Hours As Needed for Wheezing or Shortness of Air. 12/15/22   Kanwal Stacy APRN   albuterol sulfate  (90 Base) MCG/ACT inhaler Inhale TWO PUFFS BY MOUTH EVERY 4 HOURS AS NEEDED FOR FOR WHEEZING 9/3/24   Kanwal Stacy APRN   allopurinol (ZYLOPRIM) 300 MG tablet Take 1 tablet by mouth 2 (Two) Times a Day. 9/29/22   Ayden Judd MD   apixaban (ELIQUIS) 5 MG tablet tablet Take 1 tablet by mouth 2 (two) times a day.    Ayden Judd MD   atorvastatin (LIPITOR) 20 MG tablet Take 1 tablet by mouth every night at bedtime. 3/23/22   Ayden Judd MD   azelastine (ASTELIN) 0.1 % nasal spray 1 spray 2 (Two) Times a Day. 10/17/22   Ayden Judd MD   Budeson-Glycopyrrol-Formoterol (Breztri Aerosphere) 160-9-4.8 MCG/ACT aerosol inhaler Inhale 2 puffs 2 (Two) Times a Day.    Ayden Judd MD   budesonide (PULMICORT) 0.5 MG/2ML nebulizer solution Take 2 mL by nebulization 2 (Two) Times a Day for 30 days. Rinse mouth out after each use 5/12/22 9/6/23  Angelina Ayoub APRN   Diclofenac Sodium (VOLTAREN) 1 % gel gel Apply  topically to the appropriate area as directed.    Ayden Judd MD   ezetimibe (ZETIA) 10 MG tablet Take 1 tablet by mouth Daily.    Ayden Judd MD   famotidine (PEPCID) 20 MG tablet Take 1 tablet by mouth 3 (Three) Times a Day. 4/13/22   Ayden Judd MD   fluconazole (DIFLUCAN) 100 MG tablet  9/5/23   Ayden Judd MD   fluticasone (FLONASE) 50 MCG/ACT nasal spray 1 spray by Each Nare route 2 (Two) Times a Day. 1/20/23   Ayden Judd MD   fluticasone (VERAMYST) 27.5 MCG/SPRAY nasal spray 2 sprays into the nostril(s) as directed by provider Daily.    Provider, Ayden, MD   formoterol (PERFOROMIST)  20 MCG/2ML nebulizer solution Take 2 mL by nebulization 2 (Two) Times a Day. 11/15/22   Jacky Malcolm DO   gabapentin (NEURONTIN) 400 MG capsule Take 1 capsule by mouth 3 (Three) Times a Day.    Ayden Judd MD   Glycopyrrolate (Lonhala Magnair Refill Kit) 25 MCG/ML solution Inhale 2 puffs 2 (two) times a day.    Ayden Judd MD   Hydrocod Sujit-Chlorphe Sujit ER (TUSSIONEX PENNKINETIC) 10-8 MG/5ML ER suspension  4/12/23   Ayden Judd MD   Hydrocod Sujit-Chlorphe Sujit ER (TUSSIONEX PENNKINETIC) 10-8 MG/5ML ER suspension Take 5 mL by mouth Every 12 (Twelve) Hours As Needed for Cough. 9/6/23   Kanwal Stacy APRN   levothyroxine (SYNTHROID, LEVOTHROID) 75 MCG tablet Take 1 tablet by mouth Daily. 12/15/22   Ayden Judd MD   levothyroxine (SYNTHROID, LEVOTHROID) 88 MCG tablet Take 1 tablet by mouth Daily. 2/21/22   Ayden Judd MD   loratadine (CLARITIN) 10 MG tablet Take 1 tablet by mouth Daily.    Ayden Judd MD   metoprolol tartrate (LOPRESSOR) 25 MG tablet Take 1 tablet by mouth 2 (Two) Times a Day.    Ayden Judd MD   Mirabegron ER (MYRBETRIQ) 50 MG tablet sustained-release 24 hour 24 hr tablet Take 50 mg by mouth 2 (two) times a day.    Ayden Judd MD   montelukast (SINGULAIR) 10 MG tablet Take 1 tablet by mouth Every Night.    Ayden Judd MD   nystatin (MYCOSTATIN) 727810 UNIT/GM powder  9/5/23   Ayden Judd MD   ondansetron ODT (ZOFRAN-ODT) 4 MG disintegrating tablet Place 1 tablet on the tongue Every 8 (Eight) Hours As Needed for Nausea or Vomiting. 12/13/22   Kanwal Stacy APRN   ondansetron ODT (ZOFRAN-ODT) 8 MG disintegrating tablet 1 tablet 4 (Four) Times a Day As Needed. 10/26/22   Ayden Judd MD   promethazine-dextromethorphan (PROMETHAZINE-DM) 6.25-15 MG/5ML syrup  5/17/23   Provider, MD Ayden   Semaglutide,0.25 or 0.5MG/DOS, (OZEMPIC) 2 MG/1.5ML solution pen-injector Inject 1  "mg under the skin into the appropriate area as directed 1 (One) Time Per Week. Monday    Provider, Ayden, MD        Social History:   Social History     Tobacco Use    Smoking status: Former     Current packs/day: 0.00     Average packs/day: 0.5 packs/day for 5.0 years (2.5 ttl pk-yrs)     Types: Cigarettes     Start date:      Quit date: 1970     Years since quittin.0     Passive exposure: Past    Smokeless tobacco: Never   Vaping Use    Vaping status: Never Used   Substance Use Topics    Alcohol use: Never    Drug use: Never         Review of Systems:  Review of Systems   Constitutional:  Negative for chills and fever.   HENT:  Negative for congestion, rhinorrhea and sore throat.    Eyes:  Negative for photophobia.   Respiratory:  Positive for shortness of breath. Negative for apnea, cough and chest tightness.    Cardiovascular:  Negative for chest pain and palpitations.   Gastrointestinal:  Positive for diarrhea and vomiting. Negative for abdominal pain and nausea.   Endocrine: Negative.    Genitourinary:  Negative for difficulty urinating and dysuria.   Musculoskeletal:  Negative for back pain, joint swelling and myalgias.   Skin:  Negative for color change and wound.   Allergic/Immunologic: Negative.    Neurological:  Negative for seizures and headaches.   Psychiatric/Behavioral:  Positive for confusion.    All other systems reviewed and are negative.       Physical Exam:  /72   Pulse 79   Temp 99.4 °F (37.4 °C) (Oral)   Resp 20   Ht 154.9 cm (61\")   Wt 85.4 kg (188 lb 4.4 oz)   SpO2 95%   BMI 35.57 kg/m²     Physical Exam  Vitals and nursing note reviewed.   Constitutional:       General: She is awake.      Appearance: Normal appearance. She is well-developed.   HENT:      Head: Normocephalic and atraumatic.      Nose: Nose normal.      Mouth/Throat:      Mouth: Mucous membranes are moist.   Eyes:      Extraocular Movements: Extraocular movements intact.      Pupils: Pupils are " equal, round, and reactive to light.   Cardiovascular:      Rate and Rhythm: Normal rate and regular rhythm.      Heart sounds: Normal heart sounds.   Pulmonary:      Effort: Pulmonary effort is normal. No respiratory distress.      Breath sounds: Examination of the right-upper field reveals wheezing. Examination of the left-upper field reveals wheezing. Wheezing present. No rhonchi or rales.   Abdominal:      General: Bowel sounds are normal.      Palpations: Abdomen is soft.      Tenderness: There is no abdominal tenderness. There is no guarding or rebound.      Comments: No rigidity   Musculoskeletal:         General: No tenderness. Normal range of motion.      Cervical back: Normal range of motion and neck supple.   Skin:     General: Skin is warm and dry.      Coloration: Skin is not jaundiced.   Neurological:      General: No focal deficit present.      Mental Status: She is alert. Mental status is at baseline.      Cranial Nerves: No cranial nerve deficit.   Psychiatric:         Mood and Affect: Mood normal.      Comments: Patient is fully alert and able to provide a full history.  She has no active delusions or hallucinations.                    Medical Decision Making:      Comorbidities that affect care:    COPD, type 2 diabetes, CAD    External Notes reviewed:    Previous ED Note: Emergency department visit to [facility] in [facility] on 1/9/2025.  CT head negative.  Urinalysis negative.      The following orders were placed and all results were independently analyzed by me:  Orders Placed This Encounter   Procedures    COVID-19, FLU A/B, RSV PCR 1 HR TAT - Swab, Nasopharynx    XR Chest 1 View    [place] Draw    Comprehensive Metabolic Panel    BNP    High Sensitivity Troponin T    CBC Auto Differential    Urinalysis With Culture If Indicated - Urine, Clean Catch    Blood Gas, Arterial -    High Sensitivity Troponin T 1Hr    Urinalysis, Microscopic Only - Urine, Clean Catch    Blood Gas,  Arterial -    Undress & Gown    Continuous Pulse Oximetry    Vital Signs    ECG 12 Lead ED Triage Standing Order; SOA    CBC & Differential    Green Top (Gel)    Lavender Top    Gold Top - SST    Light Blue Top       Medications Given in the Emergency Department:  Medications   ondansetron (ZOFRAN) injection 4 mg (4 mg Intravenous Given 1/11/25 1721)        ED Course:    ED Course as of 01/11/25 2250   Sat Jan 11, 2025   1518 I have personally interpreted the EKG today and it shows no evidence of any acute ischemia or heart arrhythmia. [RP]      ED Course User Index  [RP] Beka Flores MD       Labs:    Lab Results (last 24 hours)       Procedure Component Value Units Date/Time    CBC & Differential [[id number]]  (Normal) Collected: 01/11/25 1441    Specimen: Blood Updated: 01/11/25 1501    Narrative:      The following orders were created for panel order CBC & Differential.  Procedure                               Abnormality         Status                     ---------                               -----------         ------                     CBC Auto Differential[[id number]]        Normal              Final result                 Please view results for these tests on the individual orders.    Comprehensive Metabolic Panel [[id number]]  (Abnormal) Collected: 01/11/25 1441    Specimen: Blood Updated: 01/11/25 1522     Glucose 123 mg/dL      BUN 16 mg/dL      Creatinine 1.35 mg/dL      Sodium 133 mmol/L      Potassium 4.3 mmol/L      Comment: Slight hemolysis detected by analyzer. Result may be falsely elevated.        Chloride 99 mmol/L      CO2 23.1 mmol/L      Calcium 8.8 mg/dL      Total Protein 6.1 g/dL      Albumin 3.5 g/dL      ALT (SGPT) 19 U/L      AST (SGOT) 33 U/L      Alkaline Phosphatase 71 U/L      Total Bilirubin 0.7 mg/dL      Globulin 2.6 gm/dL      A/G Ratio 1.3 g/dL      BUN/Creatinine Ratio 11.9     Anion Gap 10.9 mmol/L      eGFR 41.3 mL/min/1.73     Narrative:      GFR Categories in  Chronic Kidney Disease (CKD)      GFR Category          GFR (mL/min/1.73)    Interpretation  G1                     90 or greater         Normal or high (1)  G2                      60-89                Mild decrease (1)  G3a                   45-59                Mild to moderate decrease  G3b                   30-44                Moderate to severe decrease  G4                    15-29                Severe decrease  G5                    14 or less           Kidney failure          (1)In the absence of evidence of kidney disease, neither GFR category G1 or G2 fulfill the criteria for CKD.    eGFR calculation 2021 CKD-EPI creatinine equation, which does not include race as a factor    BNP [[id number]]  (Normal) Collected: 01/11/25 1441    Specimen: Blood Updated: 01/11/25 1519     proBNP 431.1 pg/mL     Narrative:      This assay is used as an aid in the diagnosis of individuals suspected of having heart failure. It can be used as an aid in the diagnosis of acute decompensated heart failure (ADHF) in patients presenting with signs and symptoms of ADHF to the emergency department (ED). In addition, NT-proBNP of <300 pg/mL indicates ADHF is not likely.    Age Range Result Interpretation  NT-proBNP Concentration (pg/mL:      <50             Positive            >450                   Gray                 300-450                    Negative             <300    50-75           Positive            >900                  Gray                300-900                  Negative            <300      >75             Positive            >1800                  Gray                300-1800                  Negative            <300    High Sensitivity Troponin T [[id number]]  (Abnormal) Collected: 01/11/25 1441    Specimen: Blood Updated: 01/11/25 1522     HS Troponin T 27 ng/L     Narrative:      High Sensitive Troponin T Reference Range:  <14.0 ng/L- Negative Female for AMI  <22.0 ng/L- Negative Male for AMI  >=14 - Abnormal Female  indicating possible myocardial injury.  >=22 - Abnormal Male indicating possible myocardial injury.   Clinicians would have to utilize clinical acumen, EKG, Troponin, and serial changes to determine if it is an Acute Myocardial Infarction or myocardial injury due to an underlying chronic condition.         CBC Auto Differential [[id number]]  (Normal) Collected: 01/11/25 1441    Specimen: Blood Updated: 01/11/25 1501     WBC 6.10 10*3/mm3      RBC 4.17 10*6/mm3      Hemoglobin 12.7 g/dL      Hematocrit 40.2 %      MCV 96.4 fL      MCH 30.5 pg      MCHC 31.6 g/dL      RDW 14.6 %      RDW-SD 52.3 fl      MPV 11.2 fL      Platelets 174 10*3/mm3      Neutrophil % 61.5 %      Lymphocyte % 29.5 %      Monocyte % 7.7 %      Eosinophil % 0.5 %      Basophil % 0.5 %      Immature Grans % 0.3 %      Neutrophils, Absolute 3.75 10*3/mm3      Lymphocytes, Absolute 1.80 10*3/mm3      Monocytes, Absolute 0.47 10*3/mm3      Eosinophils, Absolute 0.03 10*3/mm3      Basophils, Absolute 0.03 10*3/mm3      Immature Grans, Absolute 0.02 10*3/mm3      nRBC 0.0 /100 WBC     COVID-19, FLU A/B, RSV PCR 1 HR TAT - Swab, Nasopharynx [[id number]]  (Abnormal) Collected: 01/11/25 1443    Specimen: Swab from Nasopharynx Updated: 01/11/25 1543     COVID19 Not Detected     Influenza A PCR Detected     Influenza B PCR Not Detected     RSV, PCR Not Detected    Narrative:      Fact sheet for providers: https://www.fda.gov/media/178065/download    Fact sheet for patients: https://www.fda.gov/media/449770/download    Test performed by PCR.    Urinalysis With Culture If Indicated - Urine, Clean Catch [[id number]]  (Abnormal) Collected: 01/11/25 1521    Specimen: Urine, Clean Catch Updated: 01/11/25 1600     Color, UA Yellow     Appearance, UA Clear     pH, UA 5.5     Specific Gravity, UA 1.019     Glucose, UA Negative     Ketones, UA 15 mg/dL (1+)     Bilirubin, UA Negative     Blood, UA Negative     Protein, UA 30 mg/dL (1+)     Leuk Esterase, UA Negative      Nitrite, UA Negative     Urobilinogen, UA 2.0 E.U./dL    Narrative:      In absence of clinical symptoms, the presence of pyuria, bacteria, and/or nitrites on the urinalysis result does not correlate with infection.    Urinalysis, Microscopic Only - Urine, Clean Catch [[id number]]  (Abnormal) Collected: 01/11/25 1521    Specimen: Urine, Clean Catch Updated: 01/11/25 1615     RBC, UA 0-2 /HPF      WBC, UA 3-5 /HPF      Comment: Urine culture not indicated.        Bacteria, UA 1+ /HPF      Squamous Epithelial Cells, UA 3-6 /HPF      Hyaline Casts, UA 0-2 /LPF      Methodology Manual Light Microscopy    Blood Gas, Arterial - [[id number]]  (Abnormal) Collected: 01/11/25 1543    Specimen: Arterial Blood Updated: 01/11/25 1642     Site Left Brachial     Unruly's Test N/A     pH, Arterial 7.463 pH units      pCO2, Arterial 27.8 mm Hg      pO2, Arterial 65.0 mm Hg      HCO3, Arterial 19.9 mmol/L      Base Excess, Arterial -2.6 mmol/L      Comment: Serial Number: [id number]:  [id number]        O2 Saturation, Arterial 94.0 %      Hemoglobin, Blood Gas 13.4 g/dL      Hematocrit, Blood Gas 40.0 %      Barometric Pressure for Blood Gas 744.6000 mmHg      Modality Room Air     Hemodilution No    High Sensitivity Troponin T 1Hr [[id number]]  (Abnormal) Collected: 01/11/25 1551    Specimen: Blood from Arm, Left Updated: 01/11/25 1616     HS Troponin T 27 ng/L      Troponin T Numeric Delta 0 ng/L      Troponin T % Delta 0 %     Narrative:      High Sensitive Troponin T Reference Range:  <14.0 ng/L- Negative Female for AMI  <22.0 ng/L- Negative Male for AMI  >=14 - Abnormal Female indicating possible myocardial injury.  >=22 - Abnormal Male indicating possible myocardial injury.   Clinicians would have to utilize clinical acumen, EKG, Troponin, and serial changes to determine if it is an Acute Myocardial Infarction or myocardial injury due to an underlying chronic condition.                  Imaging:    XR Chest 1 View    Result Date:  1/11/2025  XR CHEST 1 VW Date of Exam: 1/11/2025 2:30 PM EST Indication: SOA Triage Protocol Comparison: 1/9/2023 CT and 9/26/2021 chest radiograph Findings: Unchanged cardiomediastinal silhouette. Low lung volumes. There are perihilar airspace opacities bilaterally. Possible trace left pleural effusion. No pneumothorax. No acute osseous abnormality.     Impression: Perihilar airspace opacities bilaterally, pulmonary edema versus multifocal pneumonia. Some of these findings may be chronic given similar pattern on prior imaging. Possible trace left pleural effusion. Electronically Signed: Cale Harris MD  1/11/2025 2:56 PM EST  Workstation ID: [id number]       Differential Diagnosis and Discussion:    Altered Mental Status: Based on the patient's signs and symptoms, differential diagnosis includes but is not limited to meningitis, stroke, sepsis, subarachnoid hemorrhage, intracranial bleeding, encephalitis, and metabolic encephalopathy.  Dyspnea: Differential diagnosis includes but is not limited to metabolic acidosis, neurological disorders, psychogenic, asthma, pneumothorax, upper airway obstruction, COPD, pneumonia, noncardiogenic pulmonary edema, interstitial lung disease, anemia, congestive heart failure, and pulmonary embolism    PROCEDURES:    Labs were collected in the emergency department and all labs were reviewed and interpreted by me.  X-ray were performed in the emergency department and all X-ray impressions were independently interpreted by me.  An EKG was performed and the EKG was interpreted by me.    ECG 12 Lead ED Triage Standing Order; SOA   Preliminary Result   HEART RATE=89  bpm   RR Tneccyhr=493  ms   AL Cbxhqnnh=464  ms   P Horizontal Axis=14  deg   P Front Axis=17  deg   QRSD Interval=84  ms   QT Pufjcreg=776  ms   VShM=935  ms   QRS Axis=-23  deg   T Wave Axis=40  deg   - OTHERWISE NORMAL ECG -   Sinus rhythm   Borderline left axis deviation   Date and Time of Study:2025-01-11 14:49:35           Procedures    MDM                     Patient Care Considerations:    CT CHEST: I considered ordering a CT scan of the chest, however patient denies chest pain.  She is hemodynamically stable.      Consultants/Shared Management Plan:    None    Social Determinants of Health:    Patient has presented with family members who are responsible, reliable and will ensure follow up care.      Disposition and Care Coordination:    Discharged: I considered escalation of care by admitting this patient to the hospital, however the patient has home oxygen.  She has normal work of breathing here in the emergency department.  Her family feels comfortable taking her home.        Final diagnoses:   Influenza A        ED Disposition       ED Disposition   Discharge    Condition   Stable    Comment   --               This medical record created using voice recognition software.             Beka Flores MD  01/11/25 5784

## 2025-01-20 LAB
QT INTERVAL: 350 MS
QTC INTERVAL: 426 MS

## 2025-03-13 RX ORDER — ALBUTEROL SULFATE 90 UG/1
INHALANT RESPIRATORY (INHALATION)
Qty: 18 G | Refills: 3 | OUTPATIENT
Start: 2025-03-13

## 2025-03-14 RX ORDER — ALBUTEROL SULFATE 90 UG/1
INHALANT RESPIRATORY (INHALATION)
Qty: 18 G | Refills: 3 | OUTPATIENT
Start: 2025-03-14

## 2025-05-05 ENCOUNTER — OFFICE VISIT (OUTPATIENT)
Dept: PULMONOLOGY | Facility: CLINIC | Age: 75
End: 2025-05-05
Payer: MEDICARE

## 2025-05-05 VITALS
TEMPERATURE: 97.5 F | DIASTOLIC BLOOD PRESSURE: 73 MMHG | BODY MASS INDEX: 37.38 KG/M2 | RESPIRATION RATE: 20 BRPM | HEIGHT: 61 IN | OXYGEN SATURATION: 97 % | WEIGHT: 198 LBS | SYSTOLIC BLOOD PRESSURE: 105 MMHG | HEART RATE: 86 BPM

## 2025-05-05 DIAGNOSIS — J45.20 MILD INTERMITTENT ASTHMA, UNSPECIFIED WHETHER COMPLICATED: ICD-10-CM

## 2025-05-05 DIAGNOSIS — R05.3 CHRONIC COUGH: Primary | ICD-10-CM

## 2025-05-05 PROCEDURE — 1160F RVW MEDS BY RX/DR IN RCRD: CPT | Performed by: INTERNAL MEDICINE

## 2025-05-05 PROCEDURE — 99214 OFFICE O/P EST MOD 30 MIN: CPT | Performed by: INTERNAL MEDICINE

## 2025-05-05 PROCEDURE — 1159F MED LIST DOCD IN RCRD: CPT | Performed by: INTERNAL MEDICINE

## 2025-05-05 RX ORDER — CYCLOSPORINE 0.5 MG/ML
EMULSION OPHTHALMIC
COMMUNITY

## 2025-05-05 RX ORDER — FLUTICASONE FUROATE, UMECLIDINIUM BROMIDE AND VILANTEROL TRIFENATATE 200; 62.5; 25 UG/1; UG/1; UG/1
POWDER RESPIRATORY (INHALATION)
COMMUNITY
Start: 2025-02-05

## 2025-05-05 RX ORDER — PREDNISONE 20 MG/1
TABLET ORAL
COMMUNITY
Start: 2025-03-12

## 2025-05-05 RX ORDER — BLOOD SUGAR DIAGNOSTIC
1 STRIP MISCELLANEOUS 2 TIMES DAILY
COMMUNITY
Start: 2025-02-26

## 2025-05-05 RX ORDER — ONDANSETRON 4 MG/1
4 TABLET, FILM COATED ORAL EVERY 8 HOURS PRN
COMMUNITY
Start: 2025-03-25

## 2025-05-05 RX ORDER — LANCETS 33 GAUGE
EACH MISCELLANEOUS
COMMUNITY
Start: 2025-02-26

## 2025-05-05 RX ORDER — NITROFURANTOIN 25; 75 MG/1; MG/1
1 CAPSULE ORAL EVERY 12 HOURS SCHEDULED
COMMUNITY
Start: 2025-03-25

## 2025-05-05 RX ORDER — NITROGLYCERIN 0.4 MG/1
TABLET SUBLINGUAL
COMMUNITY
Start: 2025-03-21

## 2025-05-05 RX ORDER — LEVOFLOXACIN 750 MG/1
1 TABLET, FILM COATED ORAL DAILY
COMMUNITY
Start: 2025-04-08

## 2025-05-05 RX ORDER — BENZONATATE 200 MG/1
1 CAPSULE ORAL 3 TIMES DAILY
COMMUNITY
Start: 2025-04-08

## 2025-05-05 NOTE — PROGRESS NOTES
"Chief Complaint  Follow-up (1 year), COPD, Asthma, Cough, and Shortness of Breath    Subjective        Lora Pastor presents to [facility] PULMONARY [facility] MEDICINE  History of Present Illness  History of Present Illness  The patient is a 74-year-old female who presents for a follow-up of COPD.    She reports experiencing congestion and a persistent cough, which she attributes to her COVID-19 infection in 2020. She also experiences shortness of breath and occasional sputum production, although she finds it difficult to expectorate. She has been using cough drops for symptom relief. Her current medication regimen includes Breztri and albuterol inhaler. She recently completed a course of Levaquin last week and was previously on prednisone. She has a history of smoking but quit approximately 59 years ago.    Supplemental Information  She had both hips replaced in January 2025. She has been experiencing low back pain and plans to visit her regular doctor next week.    SOCIAL HISTORY  She does not smoke currently. She quit smoking when she got pregnant with her child who is now 59 years old.    MEDICATIONS  Breztri, albuterol, Levaquin (discontinued), prednisone (discontinued)    Objective   Vital Signs:  /73 (BP Location: Other (Comment), Patient Position: Sitting, Cuff Size: Adult) Comment (BP Location): right wrist  Pulse 86   Temp 97.5 °F (36.4 °C) (Temporal)   Resp 20   Ht 154.9 cm (61\")   Wt 89.8 kg (198 lb)   SpO2 97% Comment: room air  BMI 37.41 kg/m²   Estimated body mass index is 37.41 kg/m² as calculated from the following:    Height as of this encounter: 154.9 cm (61\").    Weight as of this encounter: 89.8 kg (198 lb).          Physical Exam   Physical Exam  Scattered rhonchi and some wheezing noted in the lungs.    Result Review :         Results                Assessment and Plan   Diagnoses and all orders for this visit:    1. Chronic cough (Primary)  -     CT " Chest Without Contrast; Future    2. Mild intermittent asthma, unspecified whether complicated  -     Complete PFT - Pre & Post Bronchodilator; Future      Assessment & Plan  1. Chronic Obstructive Pulmonary Disease (COPD).  She presents with a persistent, productive cough and shortness of breath, indicative of a potential underlying infection. She has been on Breztri and albuterol inhaler for her COPD management. A CT scan of the chest will be ordered to evaluate for any residual infection. If the CT scan reveals any concerning findings, further diagnostic procedures such as bronchoscopy may be considered.    2. Pneumonia.  She had pneumonia in January and was treated with Levaquin, which she completed last week. Despite this, she continues to experience a wet, congested cough. The recent completion of antibiotics and steroids suggests it may be too early to fully assess the resolution of her symptoms. A CT scan of the chest will be ordered to check for any persistent infection. If the CT scan shows infection, further treatment will be discussed.    Follow-up  The patient will follow up in 1 month.         Follow Up   Return in about 4 weeks (around 6/2/2025).  Patient was given instructions and counseling regarding her condition or for health maintenance advice. Please see specific information pulled into the AVS if appropriate.         Patient or patient representative verbalized consent for the use of Ambient Listening during the visit with  Jacky Malcolm DO for chart documentation. 5/5/2025  11:46 EDT

## 2025-05-16 RX ORDER — ALBUTEROL SULFATE 90 UG/1
INHALANT RESPIRATORY (INHALATION)
Qty: 18 G | Refills: 3 | Status: SHIPPED | OUTPATIENT
Start: 2025-05-16

## 2025-05-21 ENCOUNTER — HOSPITAL ENCOUNTER (OUTPATIENT)
Dept: CT IMAGING | Facility: HOSPITAL | Age: 75
Discharge: HOME | End: 2025-05-21
Admitting: INTERNAL MEDICINE
Payer: MEDICARE

## 2025-05-21 DIAGNOSIS — R05.3 CHRONIC COUGH: ICD-10-CM

## 2025-05-21 PROCEDURE — 71250 CT THORAX DX C-: CPT

## 2025-07-15 ENCOUNTER — TELEPHONE (OUTPATIENT)
Dept: PULMONOLOGY | Facility: CLINIC | Age: 75
End: 2025-07-15

## 2025-07-17 ENCOUNTER — HOSPITAL ENCOUNTER (OUTPATIENT)
Dept: RESPIRATORY THERAPY | Facility: HOSPITAL | Age: 75
Discharge: HOME | End: 2025-07-17
Admitting: INTERNAL MEDICINE
Payer: MEDICARE

## 2025-07-17 DIAGNOSIS — J45.20 MILD INTERMITTENT ASTHMA, UNSPECIFIED WHETHER COMPLICATED: ICD-10-CM

## 2025-07-17 PROCEDURE — 94060 EVALUATION OF WHEEZING: CPT

## 2025-07-17 PROCEDURE — 94726 PLETHYSMOGRAPHY LUNG VOLUMES: CPT

## 2025-07-17 PROCEDURE — 94729 DIFFUSING CAPACITY: CPT

## 2025-07-17 RX ORDER — ALBUTEROL SULFATE 0.83 MG/ML
2.5 SOLUTION RESPIRATORY (INHALATION) ONCE
Status: COMPLETED | OUTPATIENT
Start: 2025-07-17 | End: 2025-07-17

## 2025-07-17 RX ADMIN — ALBUTEROL SULFATE 2.5 MG: 2.5 SOLUTION RESPIRATORY (INHALATION) at 10:57

## 2025-07-30 ENCOUNTER — OFFICE VISIT (OUTPATIENT)
Dept: PULMONOLOGY | Facility: CLINIC | Age: 75
End: 2025-07-30
Payer: MEDICARE

## 2025-07-30 VITALS
TEMPERATURE: 97.6 F | HEART RATE: 81 BPM | WEIGHT: 190 LBS | BODY MASS INDEX: 35.87 KG/M2 | DIASTOLIC BLOOD PRESSURE: 71 MMHG | SYSTOLIC BLOOD PRESSURE: 104 MMHG | OXYGEN SATURATION: 96 % | HEIGHT: 61 IN | RESPIRATION RATE: 16 BRPM

## 2025-07-30 DIAGNOSIS — R06.09 DYSPNEA ON EXERTION: ICD-10-CM

## 2025-07-30 DIAGNOSIS — R05.3 CHRONIC COUGH: Chronic | ICD-10-CM

## 2025-07-30 DIAGNOSIS — F17.211 NICOTINE DEPENDENCE, CIGARETTES, IN REMISSION: ICD-10-CM

## 2025-07-30 DIAGNOSIS — G47.34 NOCTURNAL HYPOXIA: ICD-10-CM

## 2025-07-30 DIAGNOSIS — J45.20 MILD INTERMITTENT ASTHMA, UNSPECIFIED WHETHER COMPLICATED: Primary | Chronic | ICD-10-CM

## 2025-07-30 RX ORDER — ENSIFENTRINE 3 MG/2.5ML
SUSPENSION RESPIRATORY (INHALATION)
COMMUNITY
Start: 2025-06-09

## 2025-07-30 RX ORDER — FUROSEMIDE 20 MG/1
20 TABLET ORAL DAILY
COMMUNITY
Start: 2025-07-16

## 2025-07-30 RX ORDER — DOXYCYCLINE 100 MG/1
CAPSULE ORAL
COMMUNITY
Start: 2025-06-17 | End: 2025-07-30

## 2025-07-30 RX ORDER — DOXYCYCLINE 100 MG/1
100 CAPSULE ORAL 2 TIMES DAILY
Qty: 20 CAPSULE | Refills: 0 | Status: SHIPPED | OUTPATIENT
Start: 2025-07-30

## 2025-07-30 NOTE — PROGRESS NOTES
"Primary Care Provider  Darryl Casarez MD     Referring Provider  No ref. provider found       Patient or patient representative verbalized consent for the use of Ambient Listening during the visit with  EMMA Sandoval for chart documentation. 7/30/2025  14:34 EDT    Chief Complaint  Asthma, COPD, Cough (Green thick mucus x1 day), Shortness of Breath, and Follow-up (PFT/CT Results )    Subjective          Lora Pastor presents to [facility]  History of Present Illness  Lora Pastor is a 74 y.o. female patient of Dr. Malcolm here for management of mild intermittent asthma, seasonal allergies, dyspnea on exertion and tobacco abuse of cigarettes in remission.     5/21/2025 chest CT: \"CT scan of the chest without IV contrast demonstrating peripheral reticulation with septal thickening and bandlike scarring in the mid and lower lungs similar to 1/9/2023. Groundglass opacities are improved in comparison to 1/9/2023.  Findings are consistent with chronic interstitial lung disease. The differential diagnosis includes post viral syndrome.\"    7/17/2025 PFT: mild restriction with severely reduced DLCO.    She continues to use and benefit from her oxygen    History of Present Illness  The patient is a 74-year-old female who presents for evaluation of shortness of breath.    She reports that her breathing is generally stable, but she experiences shortness of breath during physical activities such as walking. She describes a sensation of inadequate lung expansion when attempting to take deep breaths. She has been using an albuterol inhaler, which she finds beneficial, limiting its use to twice daily. She uses it once in the middle of the day and once at night before bed, as she feels a constriction when lying down. She also uses oxygen at night. Additionally, she uses a Trelegy inhaler, which she finds less effective than the albuterol inhaler. She was " advised by the drug store to use the Trelegy inhaler at night and to wait 4 hours after using the albuterol inhaler before using the Trelegy inhaler.    She has a history of pneumonia and is concerned about the potential for recurrence. She has been coughing up green phlegm since yesterday.    The patient had a COVID-19 infection in the past, which caused hair loss and memory issues, and she still has not regained all of her memory.       Her history of smoking is   Tobacco Use: Medium Risk (2025)    Patient History     Smoking Tobacco Use: Former     Smokeless Tobacco Use: Never     Passive Exposure: Past   .    Review of Systems   Constitutional:  Negative for chills, fatigue, fever, unexpected weight gain and unexpected weight loss.   HENT:  Congestion: Nasal.    Respiratory:  Positive for cough and shortness of breath. Negative for apnea and wheezing.         Negative for Hemoptysis     Cardiovascular:  Negative for chest pain, palpitations and leg swelling.   Skin:         Negative for cyanosis      Sleep: Negative for Excessive daytime sleepiness  Negative for morning headaches  Negative for Snoring    History reviewed. No pertinent family history.     Social History     Socioeconomic History    Marital status:    Tobacco Use    Smoking status: Former     Current packs/day: 0.00     Average packs/day: 0.5 packs/day for 3.0 years (1.5 ttl pk-yrs)     Types: Cigarettes     Start date:      Quit date:      Years since quittin.6     Passive exposure: Past    Smokeless tobacco: Never   Vaping Use    Vaping status: Never Used   Substance and Sexual Activity    Alcohol use: Never    Drug use: Never        Past Medical History:   Diagnosis Date    Asthma     COPD (chronic obstructive pulmonary disease)     Coronary artery disease     Diabetes mellitus     History of coronary angioplasty with insertion of stent         Immunization History   Administered Date(s) Administered    COVID-19  (MODERNA) 1st,2nd,3rd Dose Monovalent 01/12/2022, 02/09/2022    COVID-19 (MODERNA) BIVALENT 12+YRS 01/24/2023    FLUAD TRI 65YR+ 10/31/2017    Fluad Quad 65+ 11/23/2021, 11/28/2022    Fluzone High-Dose 65+YRS 10/19/2016, 10/30/2022    Fluzone High-Dose 65+yrs 11/19/2020    Pneumococcal Conjugate 13-Valent (PCV13) 10/31/2017    Pneumococcal Polysaccharide (PPSV23) 01/01/2017    Shingrix 11/23/2020, 01/22/2021    Td (TDVAX) 06/26/2024    Zostavax 10/19/2016         Allergies   Allergen Reactions    Lisinopril Unknown - Low Severity    Codeine Unknown - Low Severity     swells    Keflex [Cephalexin] Unknown - Low Severity     Causes nausea    Nubain [Nalbuphine] Swelling    Penicillins Unknown - Low Severity     Breaks her out          Current Outpatient Medications:     albuterol sulfate  (90 Base) MCG/ACT inhaler, Inhale TWO PUFFS BY MOUTH EVERY 4 HOURS AS NEEDED FOR FOR WHEEZING, Disp: 18 g, Rfl: 3    allopurinol (ZYLOPRIM) 300 MG tablet, Take 1 tablet by mouth 2 (Two) Times a Day., Disp: , Rfl:     apixaban (ELIQUIS) 5 MG tablet tablet, Take 1 tablet by mouth 2 (two) times a day., Disp: , Rfl:     atorvastatin (LIPITOR) 20 MG tablet, Take 1 tablet by mouth every night at bedtime., Disp: , Rfl:     benzonatate (TESSALON) 200 MG capsule, Take 1 capsule by mouth 3 times a day., Disp: , Rfl:     Diclofenac Sodium (VOLTAREN) 1 % gel gel, Apply  topically to the appropriate area as directed., Disp: , Rfl:     ezetimibe (ZETIA) 10 MG tablet, Take 1 tablet by mouth Daily., Disp: , Rfl:     famotidine (PEPCID) 20 MG tablet, Take 1 tablet by mouth 3 (Three) Times a Day., Disp: , Rfl:     fluconazole (DIFLUCAN) 100 MG tablet, , Disp: , Rfl:     fluticasone (FLONASE) 50 MCG/ACT nasal spray, 1 spray by Each Nare route 2 (Two) Times a Day., Disp: , Rfl:     furosemide (LASIX) 20 MG tablet, Take 1 tablet by mouth Daily. as directed, Disp: , Rfl:     gabapentin (NEURONTIN) 400 MG capsule, Take 1 capsule by mouth 3 (Three)  Times a Day., Disp: , Rfl:     Lancets (OneTouch Delica Plus [id number]) misc, TEST SUGAR TWICE DAILY, Disp: , Rfl:     loratadine (CLARITIN) 10 MG tablet, Take 1 tablet by mouth Daily., Disp: , Rfl:     metoprolol tartrate (LOPRESSOR) 25 MG tablet, Take 1 tablet by mouth 2 (Two) Times a Day., Disp: , Rfl:     montelukast (SINGULAIR) 10 MG tablet, Take 1 tablet by mouth Every Night., Disp: , Rfl:     nitroglycerin (NITROSTAT) 0.4 MG SL tablet, PLACE 1 TABLET UNDER TONGUE FOR CHEST PAIN, MAY REPEAT IN 5 MINUTES UPTO 3 TIMES IF NO RELIEF CALL 911., Disp: , Rfl:     nystatin (MYCOSTATIN) 624515 UNIT/GM powder, , Disp: , Rfl:     ondansetron (ZOFRAN) 4 MG tablet, Take 1 tablet by mouth Every 8 (Eight) Hours As Needed for Nausea or Vomiting., Disp: , Rfl:     ondansetron ODT (ZOFRAN-ODT) 8 MG disintegrating tablet, 1 tablet 4 (Four) Times a Day As Needed., Disp: , Rfl:     OneTouch Verio test strip, 1 each by Other route 2 (Two) Times a Day., Disp: , Rfl:     Restasis 0.05 % ophthalmic emulsion, instill 1 drop in each eye twice daily, Disp: , Rfl:     Semaglutide,0.25 or 0.5MG/DOS, (OZEMPIC) 2 MG/1.5ML solution pen-injector, Inject 1 mg under the skin into the appropriate area as directed 1 (One) Time Per Week. Monday, Disp: , Rfl:     Trelegy Ellipta 200-62.5-25 MCG/ACT inhaler, INHALE 1 INHALATION BY MOUTH ONCE DAILY AT THE SAME TIME EACH DAY, Disp: , Rfl:     albuterol (PROVENTIL) (2.5 MG/3ML) 0.083% nebulizer solution, Take 2.5 mg by nebulization Every 4 (Four) Hours As Needed for Wheezing or Shortness of Air. (Patient not taking: Reported on 7/30/2025), Disp: 180 mL, Rfl: 3    azelastine (ASTELIN) 0.1 % nasal spray, 1 spray 2 (Two) Times a Day. (Patient not taking: Reported on 7/30/2025), Disp: , Rfl:     Budeson-Glycopyrrol-Formoterol (Breztri Aerosphere) 160-9-4.8 MCG/ACT aerosol inhaler, Inhale 2 puffs 2 (Two) Times a Day. (Patient not taking: Reported on 7/30/2025), Disp: , Rfl:     budesonide (PULMICORT) 0.5  MG/2ML nebulizer solution, Take 2 mL by nebulization 2 (Two) Times a Day for 30 days. Rinse mouth out after each use (Patient not taking: Reported on 5/5/2025), Disp: 120 mL, Rfl: 11    doxycycline (VIBRAMYCIN) 100 MG capsule, Take 1 capsule by mouth 2 (Two) Times a Day., Disp: 20 capsule, Rfl: 0    fluticasone (VERAMYST) 27.5 MCG/SPRAY nasal spray, 2 sprays into the nostril(s) as directed by provider Daily. (Patient not taking: Reported on 7/30/2025), Disp: , Rfl:     formoterol (PERFOROMIST) 20 MCG/2ML nebulizer solution, Take 2 mL by nebulization 2 (Two) Times a Day. (Patient not taking: Reported on 7/30/2025), Disp: 60 each, Rfl: 11    Glycopyrrolate (Lonhala Magnair Refill Kit) 25 MCG/ML solution, Inhale 2 puffs 2 (two) times a day. (Patient not taking: Reported on 5/5/2025), Disp: , Rfl:     Hydrocod Sujit-Chlorphe Sujit ER (TUSSIONEX PENNKINETIC) 10-8 MG/5ML ER suspension, , Disp: , Rfl:     Hydrocod Sujit-Chlorphe Sujit ER (TUSSIONEX PENNKINETIC) 10-8 MG/5ML ER suspension, Take 5 mL by mouth Every 12 (Twelve) Hours As Needed for Cough. (Patient not taking: Reported on 7/30/2025), Disp: 120 mL, Rfl: 0    levothyroxine (SYNTHROID, LEVOTHROID) 75 MCG tablet, Take 1 tablet by mouth Daily. (Patient not taking: Reported on 7/30/2025), Disp: , Rfl:     levothyroxine (SYNTHROID, LEVOTHROID) 88 MCG tablet, Take 1 tablet by mouth Daily. (Patient not taking: Reported on 7/30/2025), Disp: , Rfl:     Mirabegron ER (MYRBETRIQ) 50 MG tablet sustained-release 24 hour 24 hr tablet, Take 50 mg by mouth 2 (two) times a day. (Patient not taking: Reported on 5/5/2025), Disp: , Rfl:     Ohtuvayre 3 MG/2.5ML nebulizer solution, , Disp: , Rfl:     promethazine-dextromethorphan (PROMETHAZINE-DM) 6.25-15 MG/5ML syrup, , Disp: , Rfl:      Objective   Physical Exam  Constitutional:       General: She is not in acute distress.     Appearance: Normal appearance. She is normal weight.   HENT:      Right Ear: Hearing normal.      Left Ear:  "Hearing normal.      Nose: No nasal tenderness or congestion.      Mouth/Throat:      Mouth: Mucous membranes are moist. No oral lesions.   Eyes:      Extraocular Movements: Extraocular movements intact.      Pupils: Pupils are equal, round, and reactive to light.   Cardiovascular:      Rate and Rhythm: Normal rate and regular rhythm.      Pulses: Normal pulses.      Heart sounds: Normal heart sounds. No murmur heard.  Pulmonary:      Effort: Pulmonary effort is normal.      Breath sounds: Normal breath sounds. No wheezing, rhonchi or rales.      Comments: Bibasilar crackles  Musculoskeletal:      Right lower leg: No edema.      Left lower leg: No edema.   Skin:     General: Skin is warm and dry.      Findings: No lesion or rash.   Neurological:      General: No focal deficit present.      Mental Status: She is alert and oriented to person, place, and time.   Psychiatric:         Mood and Affect: Affect normal. Mood is not anxious or depressed.         Vital Signs:   /71 (BP Location: Left arm, Patient Position: Sitting, Cuff Size: Large Adult)   Pulse 81   Temp 97.6 °F (36.4 °C) (Oral)   Resp 16   Ht 154.9 cm (61\")   Wt 86.2 kg (190 lb)   SpO2 96% Comment: RA; 2 L's HS  BMI 35.90 kg/m²        Result Review :   The following data was reviewed by: EMMA Sandoval on 07/30/2025:  CMP          1/11/2025    14:41   CMP   Glucose 123    BUN 16    Creatinine 1.35    EGFR 41.3    Sodium 133    Potassium 4.3    Chloride 99    Calcium 8.8    Total Protein 6.1    Albumin 3.5    Globulin 2.6    Total Bilirubin 0.7    Alkaline Phosphatase 71    AST (SGOT) 33    ALT (SGPT) 19    Albumin/Globulin Ratio 1.3    BUN/Creatinine Ratio 11.9    Anion Gap 10.9      CBC w/diff          1/11/2025    14:41   CBC w/Diff   WBC 6.10    RBC 4.17    Hemoglobin 12.7    Hematocrit 40.2    MCV 96.4    MCH 30.5    MCHC 31.6    RDW 14.6    Platelets 174    Neutrophil Rel % 61.5    Immature Granulocyte Rel % 0.3    Lymphocyte Rel % " 29.5    Monocyte Rel % 7.7    Eosinophil Rel % 0.5    Basophil Rel % 0.5      Data reviewed : Radiologic studies chest CT 5/21/2025, PFT 7/17/2025 and Dr. Malcolm's last office note   Procedures        Assessment and Plan    Diagnoses and all orders for this visit:    1. Mild intermittent asthma, unspecified whether complicated (Primary)  Comments:  Continue Trelegy    2. Chronic cough  Comments:  Continue Trelegy  Orders:  -     doxycycline (VIBRAMYCIN) 100 MG capsule; Take 1 capsule by mouth 2 (Two) Times a Day.  Dispense: 20 capsule; Refill: 0    3. Nocturnal hypoxia  Comments:  continue oxygen    4. Dyspnea on exertion  Comments:  Albuterol as needed    5. Nicotine dependence, cigarettes, in remission        Assessment & Plan  1. Shortness of breath.  Her lung function test indicates a mild restriction, with a score of 61, which is below the normal range of 80 or above. This could be contributing to her shortness of breath. Additionally, her oxygen diffusion test score is 43, significantly lower than the normal range of 80 or above. This could be due to various factors such as scarring, extra fluid, anemia, or sleep apnea. Her CT scan reveals some thickening and scarring, consistent with chronic interstitial lung disease or fibrosis from COVID-19. She was advised to use her albuterol inhaler first thing in the morning if needed, followed by her Trelegy inhaler after a gap of 15 to 20 minutes. She can continue using her Trelegy inhaler at night if she prefers.    2. Cough with green sputum.  She reported coughing up green sputum starting yesterday. Given her history of pneumonia and current symptoms, a prescription for doxycycline, to be taken twice daily for 10 days, was provided and sent to the pharmacy.          Follow Up   Return in about 4 months (around 11/30/2025) for Recheck.  Patient was given instructions and counseling regarding her condition or for health maintenance advice. Please see specific  information pulled into the AVS if appropriate.